# Patient Record
Sex: FEMALE | Race: WHITE
[De-identification: names, ages, dates, MRNs, and addresses within clinical notes are randomized per-mention and may not be internally consistent; named-entity substitution may affect disease eponyms.]

---

## 2022-06-22 ENCOUNTER — HOSPITAL ENCOUNTER (OUTPATIENT)
Dept: HOSPITAL 94 - SSTAY O | Age: 62
Discharge: HOME | End: 2022-06-22
Attending: RADIOLOGY
Payer: MEDICAID

## 2022-06-22 VITALS — SYSTOLIC BLOOD PRESSURE: 125 MMHG | DIASTOLIC BLOOD PRESSURE: 70 MMHG

## 2022-06-22 VITALS — SYSTOLIC BLOOD PRESSURE: 160 MMHG | DIASTOLIC BLOOD PRESSURE: 80 MMHG

## 2022-06-22 VITALS — SYSTOLIC BLOOD PRESSURE: 163 MMHG | DIASTOLIC BLOOD PRESSURE: 111 MMHG

## 2022-06-22 VITALS — SYSTOLIC BLOOD PRESSURE: 159 MMHG | DIASTOLIC BLOOD PRESSURE: 92 MMHG

## 2022-06-22 VITALS — WEIGHT: 131.18 LBS | BODY MASS INDEX: 19.88 KG/M2 | HEIGHT: 68 IN

## 2022-06-22 VITALS — DIASTOLIC BLOOD PRESSURE: 88 MMHG | SYSTOLIC BLOOD PRESSURE: 161 MMHG

## 2022-06-22 DIAGNOSIS — C49.0: ICD-10-CM

## 2022-06-22 DIAGNOSIS — E03.9: ICD-10-CM

## 2022-06-22 DIAGNOSIS — M79.89: Primary | ICD-10-CM

## 2022-06-22 DIAGNOSIS — Z79.899: ICD-10-CM

## 2022-06-22 DIAGNOSIS — Z98.890: ICD-10-CM

## 2022-06-22 DIAGNOSIS — Z85.21: ICD-10-CM

## 2022-06-22 LAB
BASOPHILS # BLD AUTO: 0 X10'3 (ref 0–0.2)
BASOPHILS NFR BLD AUTO: 0.6 % (ref 0–1)
EOSINOPHIL # BLD AUTO: 0.1 X10'3 (ref 0–0.9)
EOSINOPHIL NFR BLD AUTO: 1.6 % (ref 0–6)
ERYTHROCYTE [DISTWIDTH] IN BLOOD BY AUTOMATED COUNT: 13.2 % (ref 11.5–14.5)
HCT VFR BLD AUTO: 35.4 % (ref 35–45)
HGB BLD-MCNC: 11.8 G/DL (ref 12–16)
LYMPHOCYTES # BLD AUTO: 1 X10'3 (ref 1.1–4.8)
LYMPHOCYTES NFR BLD AUTO: 18.3 % (ref 21–51)
MCH RBC QN AUTO: 29 PG (ref 27–31)
MCHC RBC AUTO-ENTMCNC: 33.5 G/DL (ref 33–36.5)
MCV RBC AUTO: 86.5 FL (ref 78–98)
MONOCYTES # BLD AUTO: 0.4 X10'3 (ref 0–0.9)
MONOCYTES NFR BLD AUTO: 8.1 % (ref 2–12)
NEUTROPHILS # BLD AUTO: 3.8 X10'3 (ref 1.8–7.7)
NEUTROPHILS NFR BLD AUTO: 71.4 % (ref 42–75)
PLATELET # BLD AUTO: 291 X10'3 (ref 140–440)
PMV BLD AUTO: 6.4 FL (ref 7.4–10.4)
RBC # BLD AUTO: 4.09 X10'6 (ref 4.2–5.6)
WBC # BLD AUTO: 5.3 X10'3 (ref 4.5–11)

## 2022-06-22 PROCEDURE — 36415 COLL VENOUS BLD VENIPUNCTURE: CPT

## 2022-06-22 PROCEDURE — 10005 FNA BX W/US GDN 1ST LES: CPT

## 2022-06-22 PROCEDURE — 85025 COMPLETE CBC W/AUTO DIFF WBC: CPT

## 2022-06-22 PROCEDURE — 20206 BIOPSY MUSCLE PERQ NEEDLE: CPT

## 2022-06-22 NOTE — NUR
Phoned Interventional radiology, spoke to Main MCCRAY, notified him of pt ride home situation.  
Pt planning to take cab to hotel where sig other [facility] will be.  Main will update Dr. Cobos.  
Alternatives discussed with pt and Main MCCRAY, ie, local only or local and pain medication 
only.  Pt wants to get procedure done and Main will discuss with Dr. Cobos.

## 2024-01-01 ENCOUNTER — APPOINTMENT (OUTPATIENT)
Dept: RADIOLOGY | Facility: MEDICAL CENTER | Age: 64
DRG: 871 | End: 2024-01-01
Attending: HOSPITALIST
Payer: COMMERCIAL

## 2024-01-01 ENCOUNTER — APPOINTMENT (OUTPATIENT)
Dept: RADIOLOGY | Facility: MEDICAL CENTER | Age: 64
DRG: 871 | End: 2024-01-01
Attending: INTERNAL MEDICINE
Payer: COMMERCIAL

## 2024-01-01 PROCEDURE — 71045 X-RAY EXAM CHEST 1 VIEW: CPT

## 2024-04-22 ENCOUNTER — HOSPITAL ENCOUNTER (OUTPATIENT)
Dept: RADIOLOGY | Facility: MEDICAL CENTER | Age: 64
End: 2024-04-22

## 2024-04-22 ENCOUNTER — HOSPITAL ENCOUNTER (INPATIENT)
Facility: MEDICAL CENTER | Age: 64
LOS: 2 days | DRG: 177 | End: 2024-04-24
Attending: INTERNAL MEDICINE | Admitting: HOSPITALIST
Payer: COMMERCIAL

## 2024-04-22 DIAGNOSIS — J96.01 ACUTE HYPOXIC RESPIRATORY FAILURE (HCC): ICD-10-CM

## 2024-04-22 DIAGNOSIS — J18.9 POST-OBSTRUCTIVE PNEUMONIA DUE TO FOREIGN BODY ASPIRATION: ICD-10-CM

## 2024-04-22 DIAGNOSIS — C32.9 LARYNGEAL CANCER (HCC): ICD-10-CM

## 2024-04-22 DIAGNOSIS — Z78.9 FULL CODE STATUS: ICD-10-CM

## 2024-04-22 DIAGNOSIS — R00.0 REGULAR SINUS TACHYCARDIA: ICD-10-CM

## 2024-04-22 DIAGNOSIS — D64.9 ACUTE ON CHRONIC ANEMIA: ICD-10-CM

## 2024-04-22 DIAGNOSIS — Z87.891 HISTORY OF TOBACCO USE: ICD-10-CM

## 2024-04-22 DIAGNOSIS — J69.0 ASPIRATION PNEUMONIA OF RIGHT LOWER LOBE DUE TO REGURGITATED FOOD (HCC): ICD-10-CM

## 2024-04-22 DIAGNOSIS — T17.908S POST-OBSTRUCTIVE PNEUMONIA DUE TO FOREIGN BODY ASPIRATION: ICD-10-CM

## 2024-04-22 DIAGNOSIS — D69.6 THROMBOCYTOPENIA (HCC): ICD-10-CM

## 2024-04-22 LAB
ERYTHROCYTE [DISTWIDTH] IN BLOOD BY AUTOMATED COUNT: 63.9 FL (ref 35.9–50)
HCT VFR BLD AUTO: 22 % (ref 37–47)
HGB BLD-MCNC: 7 G/DL (ref 12–16)
LACTATE SERPL-SCNC: 0.9 MMOL/L (ref 0.5–2)
MCH RBC QN AUTO: 30.2 PG (ref 27–33)
MCHC RBC AUTO-ENTMCNC: 31.8 G/DL (ref 32.2–35.5)
MCV RBC AUTO: 94.8 FL (ref 81.4–97.8)
PLATELET # BLD AUTO: 104 K/UL (ref 164–446)
PMV BLD AUTO: 10.2 FL (ref 9–12.9)
RBC # BLD AUTO: 2.32 M/UL (ref 4.2–5.4)
WBC # BLD AUTO: 5.1 K/UL (ref 4.8–10.8)

## 2024-04-22 PROCEDURE — 700102 HCHG RX REV CODE 250 W/ 637 OVERRIDE(OP): Performed by: HOSPITALIST

## 2024-04-22 PROCEDURE — 36415 COLL VENOUS BLD VENIPUNCTURE: CPT

## 2024-04-22 PROCEDURE — A9270 NON-COVERED ITEM OR SERVICE: HCPCS | Performed by: HOSPITALIST

## 2024-04-22 PROCEDURE — 99223 1ST HOSP IP/OBS HIGH 75: CPT | Performed by: HOSPITALIST

## 2024-04-22 PROCEDURE — 94760 N-INVAS EAR/PLS OXIMETRY 1: CPT

## 2024-04-22 PROCEDURE — 83605 ASSAY OF LACTIC ACID: CPT

## 2024-04-22 PROCEDURE — 85027 COMPLETE CBC AUTOMATED: CPT

## 2024-04-22 PROCEDURE — 770001 HCHG ROOM/CARE - MED/SURG/GYN PRIV*

## 2024-04-22 PROCEDURE — 700111 HCHG RX REV CODE 636 W/ 250 OVERRIDE (IP): Mod: JZ | Performed by: HOSPITALIST

## 2024-04-22 PROCEDURE — 87040 BLOOD CULTURE FOR BACTERIA: CPT

## 2024-04-22 PROCEDURE — 84145 PROCALCITONIN (PCT): CPT

## 2024-04-22 PROCEDURE — 700105 HCHG RX REV CODE 258: Performed by: HOSPITALIST

## 2024-04-22 RX ORDER — LEVOTHYROXINE SODIUM 0.05 MG/1
50 TABLET ORAL
Status: DISCONTINUED | OUTPATIENT
Start: 2024-04-22 | End: 2024-04-24 | Stop reason: HOSPADM

## 2024-04-22 RX ORDER — ACETAMINOPHEN 325 MG/1
650 TABLET ORAL EVERY 6 HOURS PRN
Status: DISCONTINUED | OUTPATIENT
Start: 2024-04-22 | End: 2024-04-24 | Stop reason: HOSPADM

## 2024-04-22 RX ORDER — ONDANSETRON 4 MG/1
4 TABLET, ORALLY DISINTEGRATING ORAL EVERY 4 HOURS PRN
Status: DISCONTINUED | OUTPATIENT
Start: 2024-04-22 | End: 2024-04-24 | Stop reason: HOSPADM

## 2024-04-22 RX ORDER — DIAZEPAM 5 MG/1
5 TABLET ORAL 2 TIMES DAILY PRN
Status: DISCONTINUED | OUTPATIENT
Start: 2024-04-22 | End: 2024-04-24 | Stop reason: HOSPADM

## 2024-04-22 RX ORDER — ONDANSETRON 2 MG/ML
4 INJECTION INTRAMUSCULAR; INTRAVENOUS EVERY 4 HOURS PRN
Status: DISCONTINUED | OUTPATIENT
Start: 2024-04-22 | End: 2024-04-24 | Stop reason: HOSPADM

## 2024-04-22 RX ORDER — DIAZEPAM 5 MG/1
5 TABLET ORAL 2 TIMES DAILY
COMMUNITY

## 2024-04-22 RX ORDER — HYDROCODONE BITARTRATE AND ACETAMINOPHEN 10; 325 MG/1; MG/1
1-2 TABLET ORAL EVERY 8 HOURS PRN
Status: DISCONTINUED | OUTPATIENT
Start: 2024-04-22 | End: 2024-04-24 | Stop reason: HOSPADM

## 2024-04-22 RX ORDER — AMOXICILLIN 250 MG
2 CAPSULE ORAL EVERY EVENING
Status: DISCONTINUED | OUTPATIENT
Start: 2024-04-22 | End: 2024-04-24 | Stop reason: HOSPADM

## 2024-04-22 RX ORDER — HYDROCODONE BITARTRATE AND ACETAMINOPHEN 10; 325 MG/1; MG/1
1-2 TABLET ORAL 2 TIMES DAILY
COMMUNITY

## 2024-04-22 RX ORDER — GABAPENTIN 300 MG/1
300 CAPSULE ORAL 3 TIMES DAILY
COMMUNITY

## 2024-04-22 RX ORDER — LABETALOL HYDROCHLORIDE 5 MG/ML
10 INJECTION, SOLUTION INTRAVENOUS EVERY 4 HOURS PRN
Status: DISCONTINUED | OUTPATIENT
Start: 2024-04-22 | End: 2024-04-24 | Stop reason: HOSPADM

## 2024-04-22 RX ORDER — PROMETHAZINE HYDROCHLORIDE 25 MG/1
12.5-25 SUPPOSITORY RECTAL EVERY 4 HOURS PRN
Status: DISCONTINUED | OUTPATIENT
Start: 2024-04-22 | End: 2024-04-24 | Stop reason: HOSPADM

## 2024-04-22 RX ORDER — LEVOTHYROXINE SODIUM 0.05 MG/1
50 TABLET ORAL
COMMUNITY

## 2024-04-22 RX ORDER — GUAIFENESIN/DEXTROMETHORPHAN 100-10MG/5
10 SYRUP ORAL EVERY 6 HOURS PRN
Status: DISCONTINUED | OUTPATIENT
Start: 2024-04-22 | End: 2024-04-24 | Stop reason: HOSPADM

## 2024-04-22 RX ORDER — PROCHLORPERAZINE EDISYLATE 5 MG/ML
5-10 INJECTION INTRAMUSCULAR; INTRAVENOUS EVERY 4 HOURS PRN
Status: DISCONTINUED | OUTPATIENT
Start: 2024-04-22 | End: 2024-04-24 | Stop reason: HOSPADM

## 2024-04-22 RX ORDER — POLYETHYLENE GLYCOL 3350 17 G/17G
1 POWDER, FOR SOLUTION ORAL
Status: DISCONTINUED | OUTPATIENT
Start: 2024-04-22 | End: 2024-04-24 | Stop reason: HOSPADM

## 2024-04-22 RX ORDER — PROMETHAZINE HYDROCHLORIDE 25 MG/1
12.5-25 TABLET ORAL EVERY 4 HOURS PRN
Status: DISCONTINUED | OUTPATIENT
Start: 2024-04-22 | End: 2024-04-24 | Stop reason: HOSPADM

## 2024-04-22 RX ORDER — GABAPENTIN 300 MG/1
300 CAPSULE ORAL 3 TIMES DAILY
Status: DISCONTINUED | OUTPATIENT
Start: 2024-04-22 | End: 2024-04-24 | Stop reason: HOSPADM

## 2024-04-22 RX ORDER — MORPHINE SULFATE 4 MG/ML
2 INJECTION INTRAVENOUS
Status: DISCONTINUED | OUTPATIENT
Start: 2024-04-22 | End: 2024-04-24 | Stop reason: HOSPADM

## 2024-04-22 RX ADMIN — DIAZEPAM 5 MG: 5 TABLET ORAL at 18:10

## 2024-04-22 RX ADMIN — PIPERACILLIN SODIUM AND TAZOBACTAM SODIUM 3.38 G: 3; .375 INJECTION, POWDER, LYOPHILIZED, FOR SOLUTION INTRAVENOUS at 18:14

## 2024-04-22 RX ADMIN — GABAPENTIN 300 MG: 300 CAPSULE ORAL at 16:40

## 2024-04-22 RX ADMIN — HYDROCODONE BITARTRATE AND ACETAMINOPHEN 1 TABLET: 10; 325 TABLET ORAL at 16:40

## 2024-04-22 RX ADMIN — GUAIFENESIN SYRUP AND DEXTROMETHORPHAN 10 ML: 100; 10 SYRUP ORAL at 16:40

## 2024-04-22 RX ADMIN — DEXTROSE AND SODIUM CHLORIDE: 5; 450 INJECTION, SOLUTION INTRAVENOUS at 18:13

## 2024-04-22 RX ADMIN — PIPERACILLIN SODIUM AND TAZOBACTAM SODIUM 3.38 G: 3; .375 INJECTION, POWDER, LYOPHILIZED, FOR SOLUTION INTRAVENOUS at 21:02

## 2024-04-22 ASSESSMENT — LIFESTYLE VARIABLES
ON A TYPICAL DAY WHEN YOU DRINK ALCOHOL HOW MANY DRINKS DO YOU HAVE: 0
AVERAGE NUMBER OF DAYS PER WEEK YOU HAVE A DRINK CONTAINING ALCOHOL: 0
TOTAL SCORE: 0
CONSUMPTION TOTAL: NEGATIVE
EVER HAD A DRINK FIRST THING IN THE MORNING TO STEADY YOUR NERVES TO GET RID OF A HANGOVER: NO
DOES PATIENT WANT TO STOP DRINKING: NO
HAVE YOU EVER FELT YOU SHOULD CUT DOWN ON YOUR DRINKING: NO
TOTAL SCORE: 0
ALCOHOL_USE: NO
TOTAL SCORE: 0
EVER FELT BAD OR GUILTY ABOUT YOUR DRINKING: NO
HAVE PEOPLE ANNOYED YOU BY CRITICIZING YOUR DRINKING: NO
HOW MANY TIMES IN THE PAST YEAR HAVE YOU HAD 5 OR MORE DRINKS IN A DAY: 0

## 2024-04-22 ASSESSMENT — ENCOUNTER SYMPTOMS
NERVOUS/ANXIOUS: 0
BRUISES/BLEEDS EASILY: 0
LOSS OF CONSCIOUSNESS: 0
BLOOD IN STOOL: 0
HEADACHES: 1
CONSTIPATION: 0
PSYCHIATRIC NEGATIVE: 1
NAUSEA: 0
VOMITING: 0
SHORTNESS OF BREATH: 1
DOUBLE VISION: 0
WHEEZING: 0
HEARTBURN: 0
MYALGIAS: 1
PALPITATIONS: 0
FOCAL WEAKNESS: 0
COUGH: 1
CHILLS: 0
EYES NEGATIVE: 1
DIAPHORESIS: 0
GASTROINTESTINAL NEGATIVE: 1
DIARRHEA: 0
HEMOPTYSIS: 0
ABDOMINAL PAIN: 0
FEVER: 1
SEIZURES: 0
DIZZINESS: 0
CARDIOVASCULAR NEGATIVE: 1

## 2024-04-22 ASSESSMENT — COGNITIVE AND FUNCTIONAL STATUS - GENERAL
SUGGESTED CMS G CODE MODIFIER DAILY ACTIVITY: CJ
TOILETING: A LITTLE
MOBILITY SCORE: 22
DAILY ACTIVITIY SCORE: 22
SUGGESTED CMS G CODE MODIFIER MOBILITY: CJ
CLIMB 3 TO 5 STEPS WITH RAILING: A LITTLE
WALKING IN HOSPITAL ROOM: A LITTLE
HELP NEEDED FOR BATHING: A LITTLE

## 2024-04-22 ASSESSMENT — PATIENT HEALTH QUESTIONNAIRE - PHQ9
1. LITTLE INTEREST OR PLEASURE IN DOING THINGS: SEVERAL DAYS
3. TROUBLE FALLING OR STAYING ASLEEP OR SLEEPING TOO MUCH: SEVERAL DAYS
7. TROUBLE CONCENTRATING ON THINGS, SUCH AS READING THE NEWSPAPER OR WATCHING TELEVISION: SEVERAL DAYS
8. MOVING OR SPEAKING SO SLOWLY THAT OTHER PEOPLE COULD HAVE NOTICED. OR THE OPPOSITE, BEING SO FIGETY OR RESTLESS THAT YOU HAVE BEEN MOVING AROUND A LOT MORE THAN USUAL: NOT AT ALL
5. POOR APPETITE OR OVEREATING: SEVERAL DAYS
2. FEELING DOWN, DEPRESSED, IRRITABLE, OR HOPELESS: SEVERAL DAYS
SUM OF ALL RESPONSES TO PHQ9 QUESTIONS 1 AND 2: 2
SUM OF ALL RESPONSES TO PHQ QUESTIONS 1-9: 6
6. FEELING BAD ABOUT YOURSELF - OR THAT YOU ARE A FAILURE OR HAVE LET YOURSELF OR YOUR FAMILY DOWN: NOT AL ALL
4. FEELING TIRED OR HAVING LITTLE ENERGY: SEVERAL DAYS
9. THOUGHTS THAT YOU WOULD BE BETTER OFF DEAD, OR OF HURTING YOURSELF: NOT AT ALL

## 2024-04-22 ASSESSMENT — COPD QUESTIONNAIRES
DO YOU EVER COUGH UP ANY MUCUS OR PHLEGM?: YES, A FEW DAYS A WEEK OR MONTH
COPD SCREENING SCORE: 7
DURING THE PAST 4 WEEKS HOW MUCH DID YOU FEEL SHORT OF BREATH: SOME OF THE TIME
HAVE YOU SMOKED AT LEAST 100 CIGARETTES IN YOUR ENTIRE LIFE: YES

## 2024-04-22 ASSESSMENT — PAIN DESCRIPTION - PAIN TYPE: TYPE: ACUTE PAIN;CHRONIC PAIN

## 2024-04-22 ASSESSMENT — VISUAL ACUITY: OU: 1

## 2024-04-22 NOTE — PROGRESS NOTES
[facility] DIRECT ADMISSION REPORT  Transferring facility: [place]  Transferring physician: Dr. José Miguel Wolfe    Chief complaint: Shortness of breath  Pertinent history & patient course:   History of laryngeal cancer which is metastatic, currently on chemotherapy in [place] who presented to an outside hospital complaining of shortness of breath.  He was found to have hypoxia at 85% on room air and was placed on 2 L.  CT scan showed what appeared to be a postobstructive pneumonia and possible endobronchial metastases    Pertinent imaging & lab results:   CT scan showing postobstructive pneumonia    Consultants called prior to transfer and pertinent input from consultants: None    Code Status: Unknown per transferring provider, I personally verified with the transferring provider patient's code status and the transferring provider has confirmed this with the patient.    Reason for Transfer: Bronchoscopy    Further work up or recommendations requested prior to transfer: N/a    Patient accepted for transfer: Yes  Accepting [facility] Facility: [facility] - Nursing to notify the admitting provider when patient arrives to the unit.    Consultants to be called upon arrival: None  Admission status: Inpatient.   Floor requested: med/surg      The admitting provider is the point of contact for questions or concerns regarding patient's care.

## 2024-04-22 NOTE — CARE PLAN
The patient is Watcher - Medium risk of patient condition declining or worsening         Progress made toward(s) clinical / shift goals:  new admit in progress     Patient is not progressing towards the following goals:

## 2024-04-23 PROBLEM — J69.0 ASPIRATION PNEUMONIA OF RIGHT LOWER LOBE (HCC): Status: ACTIVE | Noted: 2024-04-23

## 2024-04-23 LAB
ANION GAP SERPL CALC-SCNC: 10 MMOL/L (ref 7–16)
BUN SERPL-MCNC: 14 MG/DL (ref 8–22)
CALCIUM SERPL-MCNC: 8.1 MG/DL (ref 8.4–10.2)
CHLORIDE SERPL-SCNC: 95 MMOL/L (ref 96–112)
CO2 SERPL-SCNC: 29 MMOL/L (ref 20–33)
CREAT SERPL-MCNC: 0.61 MG/DL (ref 0.5–1.4)
ERYTHROCYTE [DISTWIDTH] IN BLOOD BY AUTOMATED COUNT: 65.9 FL (ref 35.9–50)
GFR SERPLBLD CREATININE-BSD FMLA CKD-EPI: 100 ML/MIN/1.73 M 2
GLUCOSE SERPL-MCNC: 107 MG/DL (ref 65–99)
GRAM STN SPEC: NORMAL
HCT VFR BLD AUTO: 22.2 % (ref 37–47)
HGB BLD-MCNC: 7 G/DL (ref 12–16)
MCH RBC QN AUTO: 30.3 PG (ref 27–33)
MCHC RBC AUTO-ENTMCNC: 31.5 G/DL (ref 32.2–35.5)
MCV RBC AUTO: 96.1 FL (ref 81.4–97.8)
PLATELET # BLD AUTO: 101 K/UL (ref 164–446)
PMV BLD AUTO: 9.7 FL (ref 9–12.9)
POTASSIUM SERPL-SCNC: 3.5 MMOL/L (ref 3.6–5.5)
PROCALCITONIN SERPL-MCNC: 3.64 NG/ML
RBC # BLD AUTO: 2.31 M/UL (ref 4.2–5.4)
SIGNIFICANT IND 70042: NORMAL
SITE SITE: NORMAL
SODIUM SERPL-SCNC: 134 MMOL/L (ref 135–145)
SOURCE SOURCE: NORMAL
WBC # BLD AUTO: 3.9 K/UL (ref 4.8–10.8)

## 2024-04-23 PROCEDURE — 87070 CULTURE OTHR SPECIMN AEROBIC: CPT

## 2024-04-23 PROCEDURE — 36415 COLL VENOUS BLD VENIPUNCTURE: CPT

## 2024-04-23 PROCEDURE — 700102 HCHG RX REV CODE 250 W/ 637 OVERRIDE(OP): Performed by: HOSPITALIST

## 2024-04-23 PROCEDURE — 700111 HCHG RX REV CODE 636 W/ 250 OVERRIDE (IP): Mod: JZ | Performed by: HOSPITALIST

## 2024-04-23 PROCEDURE — 85027 COMPLETE CBC AUTOMATED: CPT

## 2024-04-23 PROCEDURE — 99254 IP/OBS CNSLTJ NEW/EST MOD 60: CPT | Performed by: INTERNAL MEDICINE

## 2024-04-23 PROCEDURE — 87186 SC STD MICRODIL/AGAR DIL: CPT

## 2024-04-23 PROCEDURE — 87077 CULTURE AEROBIC IDENTIFY: CPT

## 2024-04-23 PROCEDURE — A9270 NON-COVERED ITEM OR SERVICE: HCPCS | Performed by: HOSPITALIST

## 2024-04-23 PROCEDURE — 97161 PT EVAL LOW COMPLEX 20 MIN: CPT

## 2024-04-23 PROCEDURE — 770001 HCHG ROOM/CARE - MED/SURG/GYN PRIV*

## 2024-04-23 PROCEDURE — 700105 HCHG RX REV CODE 258: Performed by: HOSPITALIST

## 2024-04-23 PROCEDURE — 94760 N-INVAS EAR/PLS OXIMETRY 1: CPT

## 2024-04-23 PROCEDURE — 99233 SBSQ HOSP IP/OBS HIGH 50: CPT | Performed by: INTERNAL MEDICINE

## 2024-04-23 PROCEDURE — 307059 PAD,EAR PROTECTOR: Performed by: INTERNAL MEDICINE

## 2024-04-23 PROCEDURE — 80048 BASIC METABOLIC PNL TOTAL CA: CPT

## 2024-04-23 PROCEDURE — 87205 SMEAR GRAM STAIN: CPT

## 2024-04-23 RX ORDER — ACETAMINOPHEN 500 MG
500 TABLET ORAL EVERY 6 HOURS PRN
COMMUNITY

## 2024-04-23 RX ADMIN — GABAPENTIN 300 MG: 300 CAPSULE ORAL at 13:59

## 2024-04-23 RX ADMIN — DEXTROSE AND SODIUM CHLORIDE: 5; 450 INJECTION, SOLUTION INTRAVENOUS at 17:29

## 2024-04-23 RX ADMIN — PIPERACILLIN SODIUM AND TAZOBACTAM SODIUM 3.38 G: 3; .375 INJECTION, POWDER, LYOPHILIZED, FOR SOLUTION INTRAVENOUS at 04:42

## 2024-04-23 RX ADMIN — HYDROCODONE BITARTRATE AND ACETAMINOPHEN 1 TABLET: 10; 325 TABLET ORAL at 13:56

## 2024-04-23 RX ADMIN — LIDOCAINE HYDROCHLORIDE 15 ML: 20 SOLUTION ORAL; TOPICAL at 14:05

## 2024-04-23 RX ADMIN — PIPERACILLIN SODIUM AND TAZOBACTAM SODIUM 3.38 G: 3; .375 INJECTION, POWDER, LYOPHILIZED, FOR SOLUTION INTRAVENOUS at 14:32

## 2024-04-23 RX ADMIN — GABAPENTIN 300 MG: 300 CAPSULE ORAL at 20:28

## 2024-04-23 RX ADMIN — GUAIFENESIN SYRUP AND DEXTROMETHORPHAN 10 ML: 100; 10 SYRUP ORAL at 13:59

## 2024-04-23 RX ADMIN — DIAZEPAM 5 MG: 5 TABLET ORAL at 22:35

## 2024-04-23 RX ADMIN — PIPERACILLIN SODIUM AND TAZOBACTAM SODIUM 3.38 G: 3; .375 INJECTION, POWDER, LYOPHILIZED, FOR SOLUTION INTRAVENOUS at 20:38

## 2024-04-23 RX ADMIN — DIAZEPAM 5 MG: 5 TABLET ORAL at 14:30

## 2024-04-23 ASSESSMENT — ENCOUNTER SYMPTOMS
NEUROLOGICAL NEGATIVE: 1
PSYCHIATRIC NEGATIVE: 1
EYES NEGATIVE: 1
CARDIOVASCULAR NEGATIVE: 1
MYALGIAS: 1
SHORTNESS OF BREATH: 1
COUGH: 1

## 2024-04-23 ASSESSMENT — GAIT ASSESSMENTS
DEVIATION: BRADYKINETIC;DECREASED TOE OFF;DECREASED HEEL STRIKE;DECREASED BASE OF SUPPORT
GAIT LEVEL OF ASSIST: CONTACT GUARD ASSIST
DISTANCE (FEET): 75

## 2024-04-23 ASSESSMENT — PAIN DESCRIPTION - PAIN TYPE
TYPE: CHRONIC PAIN
TYPE: CHRONIC PAIN
TYPE: ACUTE PAIN;CHRONIC PAIN
TYPE: ACUTE PAIN
TYPE: CHRONIC PAIN;ACUTE PAIN

## 2024-04-23 ASSESSMENT — COGNITIVE AND FUNCTIONAL STATUS - GENERAL
WALKING IN HOSPITAL ROOM: A LITTLE
MOBILITY SCORE: 18
MOVING FROM LYING ON BACK TO SITTING ON SIDE OF FLAT BED: A LITTLE
SUGGESTED CMS G CODE MODIFIER MOBILITY: CK
TURNING FROM BACK TO SIDE WHILE IN FLAT BAD: A LITTLE
CLIMB 3 TO 5 STEPS WITH RAILING: A LITTLE
STANDING UP FROM CHAIR USING ARMS: A LITTLE
MOVING TO AND FROM BED TO CHAIR: A LITTLE

## 2024-04-23 NOTE — PROGRESS NOTES
[facility] Medicine Daily Progress Note    Date of Service  4/23/2024    Chief Complaint  Meme Hurd is a 63 y.o. female admitted from [place] on 4/22/2024 with hypoxemic respiratory failure. She has a history of laryngeal cancer and has been undergoing chemotherapy.    Hospital Course  Procalcitonin was 3.64.  Pulmonology were consulted, and bronchoscopy was planned. Zosyn was started for presumed aspiration pneumonia.    Interval Problem Update  Patient had an episode of bronchospasm today, symptoms improved with valium.  On 3 L/min nasal cannula oxygen.  Home oxygen ordered.  Discussed with pulmonology, recommendations are to postpone bronchoscopy today, reimage in 8 to 12 weeks.    I have discussed this patient's plan of care and discharge plan at IDT rounds today with Case Management, Nursing, Nursing leadership, and other members of the IDT team.    Consultants/Specialty  pulmonary    Code Status  Full Code    Disposition  The patient is not medically cleared for discharge to home or a post-acute facility.  Anticipate discharge to: home with close outpatient follow-up    I have placed the appropriate orders for post-discharge needs.    Review of Systems  Review of Systems   Constitutional:  Positive for malaise/fatigue.   HENT: Negative.     Eyes: Negative.    Respiratory:  Positive for shortness of breath.    Cardiovascular: Negative.    Genitourinary: Negative.    Musculoskeletal:  Positive for myalgias.   Skin: Negative.    Neurological: Negative.    Endo/Heme/Allergies: Negative.    Psychiatric/Behavioral: Negative.          Physical Exam  Temp:  [36.3 °C (97.4 °F)-37.1 °C (98.7 °F)] 36.8 °C (98.3 °F)  Pulse:  [] 106  Resp:  [16-20] 20  BP: ()/(45-61) 87/45  SpO2:  [95 %-100 %] 99 %    Physical Exam  Constitutional:       Appearance: She is ill-appearing.   HENT:      Head: Normocephalic.      Nose: Nose normal.      Mouth/Throat:      Mouth: Mucous membranes are moist.   Eyes:      Pupils:  Pupils are equal, round, and reactive to light.   Cardiovascular:      Rate and Rhythm: Tachycardia present.   Pulmonary:      Comments: Course breath sounds  Abdominal:      Palpations: Abdomen is soft.   Musculoskeletal:      Cervical back: Normal range of motion.      Right lower leg: No edema.      Left lower leg: No edema.   Skin:     General: Skin is warm.   Neurological:      General: No focal deficit present.      Mental Status: She is alert.   Psychiatric:         Mood and Affect: Mood normal.         Fluids    Intake/Output Summary (Last 24 hours) at 4/23/2024 1532  Last data filed at 4/23/2024 0032  Gross per 24 hour   Intake --   Output 50 ml   Net -50 ml       Laboratory  Recent Labs     04/22/24  1751 04/23/24  0344   WBC 5.1 3.9*   RBC 2.32* 2.31*   HEMOGLOBIN 7.0* 7.0*   HEMATOCRIT 22.0* 22.2*   MCV 94.8 96.1   MCH 30.2 30.3   MCHC 31.8* 31.5*   RDW 63.9* 65.9*   PLATELETCT 104* 101*   MPV 10.2 9.7     Recent Labs     04/23/24  0344   SODIUM 134*   POTASSIUM 3.5*   CHLORIDE 95*   CO2 29   GLUCOSE 107*   BUN 14   CREATININE 0.61   CALCIUM 8.1*                   Imaging  No orders to display        Assessment/Plan  * Post-obstructive pneumonia due to foreign body aspiration- (present on admission)  Assessment & Plan  Concern for aspiration with underlying metastatic disease.  Procalcitonin was 3.64 pulmonology were consulted.  Currently on Zosyn.  Discussed with pulmonology today.  Per pulmonology, bronchoscopy has been postponed and recommendations are to reimage in 8 to 12 weeks, then determine if bronchoscopy is needed.  Meanwhile we will continue antibiotic course.  Home oxygen has been ordered    Acute on chronic anemia- (present on admission)  Assessment & Plan  Secondary to ongoing chemotherapy.  Transfuse to keep hemoglobin above 7.0    Regular sinus tachycardia- (present on admission)  Assessment & Plan  Patient has been noted to be sinus tachycardia since she reported to the emergency room  yesterday.  She was as high as 130.  This may be secondary to early SIRS versus sepsis.  Patient was given fluid resuscitation and has improved currently she is in the 100-110 range.    Acute hypoxic respiratory failure (HCC)- (present on admission)  Assessment & Plan  Secondary to pneumonia.  On Zosyn.  Currently using 3 L/min nasal cannula oxygen.  Home oxygen ordered.  Planned for repeat imaging in 8 to 12 weeks, then decide if bronchoscopy is needed.    Thrombocytopenia (HCC)- (present on admission)  Assessment & Plan  Secondary to chemotherapy.  Monitor and transfuse as needed    History of tobacco use- (present on admission)  Assessment & Plan  Extensive history of tobacco use, patient quit tobacco use in 2016     Laryngeal cancer (HCC)- (present on admission)  Assessment & Plan  History of laryngeal cancer and chronic tobacco use.  S/p radiation and chemotherapy.  Continue outpatient follow-up with oncology    Full code status- (present on admission)  Assessment & Plan  Patient wishes to be full code         VTE prophylaxis: SCDs

## 2024-04-23 NOTE — CARE PLAN
The patient is Stable - Low risk of patient condition declining or worsening    Shift Goals  Clinical Goals: monitor labs, pain, fall precautions, increase activity with comfort, Respiratory status and O2 requirements   Patient Goals: rest and comfort  Family Goals: family on bedside    Progress made toward(s) clinical / shift goals:  in progress     Patient is not progressing towards the following goals:

## 2024-04-23 NOTE — PROGRESS NOTES
4 Eyes Skin Assessment Completed by Leslie RN and YRN Garnica.    Head WDL  Ears WDL  Nose WDL  Mouth Ulcer  Neck WDL  Breast/Chest WDL  Shoulder Blades WDL  Spine WDL  (R) Arm/Elbow/Hand WDL  (L) Arm/Elbow/Hand WDL  Abdomen WDL  Groin WDL  Scrotum/Coccyx/Buttocks  Redness Blanching  (R) Leg Scab  (L) Leg Scab  (R) Heel/Foot/Toe WDL  (L) Heel/Foot/Toe WDL     Devices In Places Pulse Ox        Interventions In Place Sacral Mepilex, NC W/Ear Foams and Pillows     Possible Skin Injury Yes     Pictures Uploaded Into Epic N/A  Wound Consult Placed Yes  RN Wound Prevention Protocol Ordered Yes

## 2024-04-23 NOTE — CARE PLAN
The patient is Stable - Low risk of patient condition declining or worsening    Shift Goals  Clinical Goals: pt will tolerate NPO, pt pain on tolerable level 3/10  Patient Goals: rest and comfort  Family Goals: family on bedside    Progress made toward(s) clinical / shift goals:  pt denies pain/discomfort at this time, no complaints of n/v nor abdominal pain, swabbed her mouth and applied moisturizer for dryness.    Patient is not progressing towards the following goals:

## 2024-04-23 NOTE — ASSESSMENT & PLAN NOTE
Patient has been noted to be sinus tachycardia since she reported to the emergency room yesterday.  She was as high as 130.  This may be secondary to early SIRS versus sepsis.  Patient was given fluid resuscitation and has improved currently she is in the 100-110 range.

## 2024-04-23 NOTE — H&P
Hospital Medicine History & Physical Note    Date of Service  4/22/2024    Primary Care Physician  No primary care provider on file.    Consultants  pulmonary    Specialist Names: Dr. Forrest    Code Status  Full Code    Chief Complaint  No chief complaint on file.      History of Presenting Illness  Meme Hurd is a 63 y.o. female who presented 4/22/2024 on transfer from [facility] due to hypoxic respiratory failure.  Patient reported to the emergency room yesterday because of worsening shortness of breath after chemotherapy for laryngeal cancer.  The patient has had a cancer since 2017.  She was receiving chemo and radiation therapy for this.  She is still not in remission.  Imaging studies now show possible worsening infiltrates because of aspiration and metastasis..  The patient was transferred to our facility for evaluation with bronchoscopy.  I have consulted pulmonology and they are comparing to see the patient and placed her on the schedule for bronchoscopy.  Since the patient appears to have aspiration pneumonia with elevated white blood cell count and worsening respiratory failure I will place her on Zosyn after obtaining sputum and blood cultures.  Patient has diffuse rhonchi bilaterally and she is immunocompromise.  Patient is also considerably anemic and may need a blood transfusion.  We will at this point run a stat H&H      I discussed the plan of care with patient, bedside RN, and pulmonologist .    Review of Systems  Review of Systems   Constitutional:  Positive for fever and malaise/fatigue. Negative for chills and diaphoresis.   HENT: Negative.     Eyes: Negative.  Negative for double vision.   Respiratory:  Positive for cough and shortness of breath. Negative for hemoptysis and wheezing.    Cardiovascular: Negative.  Negative for chest pain, palpitations and leg swelling.   Gastrointestinal: Negative.  Negative for abdominal pain, blood in stool, constipation, diarrhea, heartburn,  nausea and vomiting.   Genitourinary: Negative.  Negative for frequency, hematuria and urgency.   Musculoskeletal:  Positive for myalgias. Negative for joint pain.   Skin: Negative.  Negative for itching and rash.   Neurological:  Positive for headaches. Negative for dizziness, focal weakness, seizures and loss of consciousness.   Endo/Heme/Allergies: Negative.  Does not bruise/bleed easily.   Psychiatric/Behavioral: Negative.  Negative for suicidal ideas. The patient is not nervous/anxious.    All other systems reviewed and are negative.      Past Medical History   has a past medical history of Cancer (HCC).    Surgical History   has no past surgical history on file.     Family History  No significant cancer history in the family, but did have heart disease in the family  Family history reviewed with patient. There is no family history that is pertinent to the chief complaint.     Social History  History of tobacco 40 years  Social alcohol biweekly  No recreational drug use  Full CODE STATUS    Allergies  No Known Allergies    Medications  Prior to Admission Medications   Prescriptions Last Dose Informant Patient Reported? Taking?   HYDROcodone/acetaminophen (NORCO)  MG Tab   Yes Yes   Sig: Take 1-2 Tablets by mouth every 8 hours as needed for Moderate Pain or Severe Pain. Indications: Pain   MBX (diphenhydrAMINE-lidocaine-Maalox) oral susp Cup   Yes Yes   Sig: Take 15 mL by mouth as needed.   diazePAM (VALIUM) 5 MG Tab   Yes Yes   Sig: Take 5 mg by mouth 2 times a day as needed for Anxiety. Indications: neck and / or throat spasms   gabapentin (NEURONTIN) 300 MG Cap   Yes Yes   Sig: Take 300 mg by mouth 3 times a day.   levothyroxine (SYNTHROID) 50 MCG Tab   Yes Yes   Sig: Take 50 mcg by mouth every morning on an empty stomach.      Facility-Administered Medications: None       Physical Exam  Temp:  [36.3 °C (97.3 °F)] 36.3 °C (97.3 °F)  Pulse:  [76] 76  Resp:  [18] 18  BP: (118)/(78) 118/78  SpO2:  [92 %-96  %] 96 %  Blood Pressure: 118/78   Temperature: 36.3 °C (97.3 °F)   Pulse: 76   Respiration: 18   Pulse Oximetry: 96 %       Physical Exam  Vitals and nursing note reviewed. Exam conducted with a chaperone present.   Constitutional:       General: She is awake.      Appearance: Normal appearance. She is well-developed, well-groomed and normal weight.   HENT:      Head: Normocephalic and atraumatic.      Jaw: There is normal jaw occlusion. No trismus.      Salivary Glands: Right salivary gland is not tender. Left salivary gland is not tender.      Right Ear: External ear normal.      Left Ear: External ear normal.      Nose: Nose normal.      Mouth/Throat:      Mouth: Mucous membranes are dry.      Pharynx: Oropharynx is clear. No oropharyngeal exudate or posterior oropharyngeal erythema.   Eyes:      General: Lids are normal. Vision grossly intact.         Right eye: No discharge.         Left eye: No discharge.      Extraocular Movements: Extraocular movements intact.      Conjunctiva/sclera: Conjunctivae normal.      Right eye: Right conjunctiva is not injected. No exudate.     Left eye: Left conjunctiva is not injected. No exudate.     Pupils: Pupils are equal, round, and reactive to light.   Neck:      Thyroid: No thyroid mass.      Vascular: No hepatojugular reflux or JVD.      Trachea: No abnormal tracheal secretions or tracheal deviation.   Cardiovascular:      Rate and Rhythm: Regular rhythm. Tachycardia present. Occasional Extrasystoles are present.     Pulses: Normal pulses.      Heart sounds: Normal heart sounds. No murmur heard.     No friction rub.   Pulmonary:      Effort: Tachypnea and accessory muscle usage present.      Breath sounds: Decreased air movement present. Examination of the right-upper field reveals decreased breath sounds and rhonchi. Examination of the left-upper field reveals decreased breath sounds and rhonchi. Examination of the right-middle field reveals decreased breath sounds and  rhonchi. Examination of the left-middle field reveals decreased breath sounds and rhonchi. Examination of the right-lower field reveals decreased breath sounds and rhonchi. Examination of the left-lower field reveals decreased breath sounds and rhonchi. Decreased breath sounds and rhonchi present.   Abdominal:      General: Abdomen is flat.      Palpations: Abdomen is soft.      Tenderness: There is no abdominal tenderness. There is no right CVA tenderness or left CVA tenderness.      Hernia: No hernia is present.   Musculoskeletal:         General: Normal range of motion.      Cervical back: Full passive range of motion without pain, normal range of motion and neck supple. No rigidity. No muscular tenderness.      Right lower leg: No edema.      Left lower leg: No edema.   Lymphadenopathy:      Head:      Right side of head: No submental adenopathy.      Left side of head: No submental adenopathy.      Cervical:      Right cervical: No superficial cervical adenopathy.     Left cervical: No superficial cervical adenopathy.      Upper Body:      Right upper body: No supraclavicular adenopathy.      Left upper body: No supraclavicular adenopathy.   Skin:     General: Skin is warm and dry.      Capillary Refill: Capillary refill takes less than 2 seconds.      Coloration: Skin is not cyanotic or pale.      Findings: No abrasion or bruising.   Neurological:      General: No focal deficit present.      Mental Status: She is alert and oriented to person, place, and time. Mental status is at baseline.      GCS: GCS eye subscore is 4. GCS verbal subscore is 5. GCS motor subscore is 6.      Cranial Nerves: No cranial nerve deficit.      Sensory: No sensory deficit.      Motor: Motor function is intact.      Deep Tendon Reflexes:      Reflex Scores:       Tricep reflexes are 2+ on the right side and 2+ on the left side.       Bicep reflexes are 2+ on the right side and 2+ on the left side.       Brachioradialis reflexes are  "2+ on the right side and 2+ on the left side.       Patellar reflexes are 2+ on the right side and 2+ on the left side.       Achilles reflexes are 2+ on the right side and 2+ on the left side.  Psychiatric:         Attention and Perception: Attention and perception normal.         Mood and Affect: Mood normal.         Speech: Speech normal.         Behavior: Behavior normal. Behavior is cooperative.         Thought Content: Thought content normal.         Cognition and Memory: Cognition and memory normal.         Judgment: Judgment normal.         Laboratory:          No results for input(s): \"ALTSGPT\", \"ASTSGOT\", \"ALKPHOSPHAT\", \"TBILIRUBIN\", \"DBILIRUBIN\", \"GAMMAGT\", \"AMYLASE\", \"LIPASE\", \"ALB\", \"PREALBUMIN\", \"GLUCOSE\" in the last 72 hours.      No results for input(s): \"NTPROBNP\" in the last 72 hours.      No results for input(s): \"TROPONINT\" in the last 72 hours.    Imaging:  No orders to display       X-Ray:  I have personally reviewed the images and compared with prior images.  EKG:  I have personally reviewed the images and compared with prior images.    Assessment/Plan:  Justification for Admission Status  I anticipate this patient will require at least two midnights for appropriate medical management, necessitating inpatient admission because the patient has acute hypoxic respiratory failure with postobstructive pneumonia and require least 48 hours of inpatient management with respiratory failure    Patient will need a Med/Surg bed on MEDICAL service .  The need is secondary to acute hypoxic respiratory failure with postobstructive pneumonia.    * Post-obstructive pneumonia due to foreign body aspiration- (present on admission)  Assessment & Plan  Per the transferring facility's CAT scan they believe the patient has possible aspiration due to possible metastasis.  They recommended bronchoscopy and thus the patient was transferred to our facility for bronchoscopy.  I have spoken with pulmonology Dr. Forrest " who will be making arrangements to achieve the bronchoscopy.  In the meantime patient will be on antibiotics with Zosyn for potential aspiration.  Fluid resuscitation  Monitor cultures from the blood in the sputum.  Obtain procalcitonin level  Obtain lactic acid level     Acute hypoxic respiratory failure (HCC)- (present on admission)  Assessment & Plan  Patient presented to the [place] emergency room on 4/21/2022 with shortness of breath and was found to be only 85% saturating on room air.  She is just past chemotherapy for laryngeal cancer with metastasis.  The patient had to be put on oxygen support.  Continue with oxygen support and titrate to keep oxygen saturations above 90%      Acute on chronic anemia- (present on admission)  Assessment & Plan  Patient has chronic anemia secondary to the ongoing chemotherapy.  She has not developed worsening anemia with hemoglobin 6.9 hematocrit 20.7.  Repeat H&H urgently here and if low will transfuse    Regular sinus tachycardia- (present on admission)  Assessment & Plan  Patient has been noted to be sinus tachycardia since she reported to the emergency room yesterday.  She was as high as 130.  This may be secondary to early SIRS versus sepsis.  Patient was given fluid resuscitation and has improved currently she is in the 100-110 range.    Thrombocytopenia (HCC)- (present on admission)  Assessment & Plan  Secondary to the chemotherapy patient has developed thrombocytopenia  Continue to monitor platelet count    Laryngeal cancer (HCC)- (present on admission)  Assessment & Plan  History of laryngeal cancer most likely secondary to chronic smoking  Patient has had the cancer since 2017.  She stopped smoking in 2016.  Since then she has had radiation and chemotherapy.  She says since her most recent hemotherapy was 2 weeks ago.  Continue outpatient follow-ups with oncology    Full code status- (present on admission)  Assessment & Plan  Patient wishes to be full  code    History of tobacco use- (present on admission)  Assessment & Plan  She smoked for about 40 years she says a pack a day quit in 2016        VTE prophylaxis: SCDs/TEDs

## 2024-04-23 NOTE — PROGRESS NOTES
Medication history reviewed with pt. Med rec is complete.  Allergies reviewed, per pt    [facility][phone][facility] every 3 weeks.  Pt receives a chemo ball for 3 days after chemo, last day for her chemo ball was on 4/19/2024      Patient has not had any outpatient antibiotics in the last 30 days.    Pt is not on any anticoagulants

## 2024-04-23 NOTE — PROGRESS NOTES
4 Eyes Skin Assessment Completed by Ivy, RN and Ileana RN.    Head WDL  Ears WDL  Nose WDL  Mouth Ulcer(s)- pt verbalizes dry throat, medication for blistering  - needs further / monitored evaluation unable to see any at this time  Neck WDL  Breast/Chest WDL  Shoulder Blades WDL  Spine WDL  (R) Arm/Elbow/Hand WDL  (L) Arm/Elbow/Hand WDL  Abdomen WDL  Groin WDL - unable to assess per pt at this time; pt verbalizes skin intact  education provided for further assessment   Scrotum/Coccyx/Buttocks WDL- unable to assess per pt at this time; pt verbalizes skin intact  - education provided for further assessment   (R) Leg Scab  (L) Leg Scab  (R) Heel/Foot/Toe WDL  (L) Heel/Foot/Toe WDL    Devices In Places Pulse Ox      Interventions In Place NC W/Ear Foams and Pillows    Possible Skin Injury No    Pictures Uploaded Into Epic N/A  Wound Consult Placed N/A  RN Wound Prevention Protocol Ordered No

## 2024-04-23 NOTE — PROGRESS NOTES
Received report from night shift RN. Assumed pt care 0700  Pt is drowsy, opens eyes to voice and  resting comfortably in bed, no distress noted. Plan of care reviewed for activities and goals this shift. Medication eduction provided.  Pt verbalizes understanding of plan of care for this shift.  Pt denies pain and/or nausea at this moment.  Patients needs attended well. Fall precautions in place. Bed at lowest position. Call light and personal belongings within reach.   Hourly rounding in progress.  Pt NPO status for possible procedure today. No updated plan of care or scheduled procedure at this time.   New orders for change in diet and plan of care updated.   Pt will require home O2 - case mngr aware - no updates on O2 delivery.   Pt and family are counting on being discharged today. Pt has been up to chair this shift for her meal.   Medication education provided.   Pt experienced a few minutes of throat spasms. O2 stable. Relaxation techniques and medications provided and spasms resolved.   Home care education regarding symptoms reviewed.   Pt now resting in bed. No distress noted after this episode. New IV established IV ABX infusing.   Spouse at bedside.   1600-Pt resting in bed eyes closed - no distress noted.

## 2024-04-23 NOTE — CONSULTS
Pulmonary Consultation    Date of Service  4/23/2024    Referring Physician  Danita Fabian M.D.    Consulting Physician  Shawn Baldwin D.O.    Reason for Consultation  Pneumonia    History of Presenting Illness  63 y.o. female who presented 4/22/2024 with pneumonia.  She initially presented to a hospital in [place].  CT imaging revealed pneumonia with right middle lobe and right lower lobe pattern.  On my interview, she endorses that she has had chronic dysphagia and chronic aspiration ever since having head and neck cancer with radiation treatments to the left side of her face.  She eats a pureed diet.  She has had several recurrent pneumonias.  She is currently getting chemotherapy.    Review of Systems  Review of Systems   Constitutional:  Positive for malaise/fatigue.   Respiratory:  Positive for cough.    All other systems reviewed and are negative.      Past Medical History   has a past medical history of Cancer (HCC).    Surgical History   has no past surgical history on file.    Family History  family history is not on file.    Social History       Medications  Prior to Admission Medications   Prescriptions Last Dose Informant Patient Reported? Taking?   BIOTIN PO 4/22/2024 at 1000 Patient Yes Yes   Sig: Take 1 Spray by mouth as needed (For dry mouth).   HYDROcodone/acetaminophen (NORCO)  MG Tab 4/22/2024 at 1400 Patient Yes Yes   Sig: Take 1-2 Tablets by mouth 2 times a day. Indications: Pain   Multiple Vitamins-Minerals (MULTIVITAMIN GUMMIES ADULTS PO) 4/22/2024 at 0900 Patient Yes Yes   Sig: Take 2 Tablets by mouth every day.   acetaminophen (TYLENOL) 500 MG Tab 4/22/2024 at 1300 Patient Yes Yes   Sig: Take 500 mg by mouth every 6 hours as needed. Indications: Pain   diazePAM (VALIUM) 5 MG Tab 4/22/2024 at 0900 Patient Yes Yes   Sig: Take 5 mg by mouth 2 times a day. Indications: neck and / or throat spasms   gabapentin (NEURONTIN) 300 MG Cap 4/22/2024 at 1300 Patient Yes Yes   Sig: Take  300 mg by mouth 3 times a day.   levothyroxine (SYNTHROID) 50 MCG Tab 4/22/2024 at 0500 Patient Yes Yes   Sig: Take 50 mcg by mouth every morning on an empty stomach.   magic mouthwash 4/22/2024 at 1200 Patient Yes Yes   Sig: Take 5 mL by mouth 3 times a day. Shake well      Facility-Administered Medications: None       Allergies  No Known Allergies    Physical Exam  Temp:  [36.3 °C (97.3 °F)-37.1 °C (98.7 °F)] 37.1 °C (98.7 °F)  Pulse:  [76-95] 95  Resp:  [16-18] 16  BP: ()/(49-78) 91/49  SpO2:  [92 %-100 %] 100 %    Physical Exam  Vitals reviewed.   Constitutional:       General: She is awake.      Comments: Frail-appearing   HENT:      Mouth/Throat:      Pharynx: Oropharynx is clear.   Eyes:      General:         Right eye: No discharge.         Left eye: No discharge.      Conjunctiva/sclera: Conjunctivae normal.      Pupils: Pupils are equal, round, and reactive to light.   Cardiovascular:      Rate and Rhythm: Normal rate.      Pulses: Normal pulses.   Pulmonary:      Effort: Pulmonary effort is normal.      Breath sounds: Normal breath sounds.   Musculoskeletal:      Right lower leg: No edema.      Left lower leg: No edema.   Skin:     General: Skin is warm.      Capillary Refill: Capillary refill takes less than 2 seconds.      Coloration: Skin is not jaundiced.   Neurological:      General: No focal deficit present.      Mental Status: She is alert.   Psychiatric:         Behavior: Behavior normal.         Fluids      Laboratory  Recent Labs     04/22/24  1751 04/23/24  0344   WBC 5.1 3.9*   RBC 2.32* 2.31*   HEMOGLOBIN 7.0* 7.0*   HEMATOCRIT 22.0* 22.2*   MCV 94.8 96.1   MCH 30.2 30.3   MCHC 31.8* 31.5*   RDW 63.9* 65.9*   PLATELETCT 104* 101*   MPV 10.2 9.7     Recent Labs     04/23/24  0344   SODIUM 134*   POTASSIUM 3.5*   CHLORIDE 95*   CO2 29   GLUCOSE 107*   BUN 14   CREATININE 0.61   CALCIUM 8.1*                     Imaging    CT with right middle lobe and right lower lobe consolidative pattern  consistent with aspiration pneumonitis/pneumonia    Assessment/Plan  Aspiration pneumonia of right lower lobe (HCC)  Assessment & Plan  Known chronic aspiration secondary to dysphagia secondary to head neck cancer with a history of radiation treatments to the left neck.  Consider a speech therapy consult for swallow evaluation.  Treat for aspiration pneumonia  Repeat CT of the chest in 8 to 12 weeks as an outpatient to evaluate for resolution.  If persistent abnormalities noted on CT, an outpatient bronchoscopy may be indicated.  I did discuss possibly doing an inpatient bronchoscopy with her.  I offered this due to the fact that she lives in [place].  She stated that she would not have difficulty coming to clinic in [place] and is okay with treating the pneumonia and doing follow up imaging with bronchoscopy as needed.       We will sign off. Please do not hesitate to contact us if we can be of any further assistance. I am most easily reached via [employer] secure messaging application.    Shawn Baldwin, DO  Staff Pulmonologist and Intensivist  [facility]     Please note that this dictation was created using voice recognition software. The accuracy of the dictation is limited to the abilities of the software. I have made every reasonable attempt to correct obvious errors, but I expect that there are errors of grammar and possibly content that I did not discover before finalizing the note.

## 2024-04-23 NOTE — ASSESSMENT & PLAN NOTE
Secondary to pneumonia.  On Zosyn.  Currently using 3 L/min nasal cannula oxygen.  Home oxygen ordered.  Planned for repeat imaging in 8 to 12 weeks, then decide if bronchoscopy is needed.

## 2024-04-23 NOTE — ASSESSMENT & PLAN NOTE
Known chronic aspiration secondary to dysphagia secondary to head neck cancer with a history of radiation treatments to the left neck.  Consider a speech therapy consult for swallow evaluation.  Treat for aspiration pneumonia  Repeat CT of the chest in 8 to 12 weeks as an outpatient to evaluate for resolution.  If persistent abnormalities noted on CT, an outpatient bronchoscopy may be indicated.  I did discuss possibly doing an inpatient bronchoscopy with her.  I offered this due to the fact that she lives in [place].  She stated that she would not have difficulty coming to clinic in [place] and is okay with treating the pneumonia and doing follow up imaging with bronchoscopy as needed.

## 2024-04-23 NOTE — PROGRESS NOTES
Received bedside report from morning RN, resumed care of pt. Pt resting comfortably in bed, resp even and unlabored no s/s of resp distress. No complaints at this time, no report of pain/discomfort. Pt on strict NPO. Safety measures in place, treaded socks on, bed alarm on. Call light within reach. Hourly rounding in place.

## 2024-04-23 NOTE — DISCHARGE PLANNING
Addendum:  4-23-24/1651  Home oxygen orders placed 1535 today.  Per Dr. Fabian, patient not leaving today because of bronchospasms.  Referral sent to [facility].      Case Management Discharge Planning    Admission Date: 4/22/2024  GMLOS: 5  ALOS: 1    6-Clicks ADL Score: 22  6-Clicks Mobility Score: 18      Anticipated Discharge Dispo: Discharge Disposition: Discharged to home/self care (01)  Discharge Address: [address] [place] [address]  [address]  Discharge Contact Phone Number: [phone]    DME Needed: home oxygen    Action(s) Taken:   RN JJ met with patient and patient's significant other, Norman at bedside.  Pt lives in a [address] [place] [address] in [place] with her significant other, Norman.  Pt's PCP is at [facility] in [place].  Pt agreeable with home oxygen.  She signed choice form for [facility].  Form faxed to [facility].  Voalte msg to Dr. Fabian for orders and face to face please.  PT recommending home health.  No home health available for patient with [facility] in [place] unfortunately.    Medically Clear: Yes    Next Steps:   Follow up with [facility].    Barriers to Discharge: DME    Care Transition Team Assessment    Information Source  Orientation Level: Oriented X4  Information Given By: Patient  Who is responsible for making decisions for patient? : Patient    Readmission Evaluation  Is this a readmission?: No    Elopement Risk  Legal Hold: No  Ambulatory or Self Mobile in Wheelchair: No-Not an Elopement Risk  Elopement Risk: Not at Risk for Elopement    Interdisciplinary Discharge Planning  Lives with - Patient's Self Care Capacity: Significant Other  Patient or legal guardian wants to designate a caregiver: No  Support Systems: Spouse / Significant Other  Housing / Facility: [address] [facility]  Prior Services: Home-Independent  Durable Medical Equipment: Not Applicable    Discharge Preparedness  What is your plan after discharge?: Home with  help  What are your discharge supports?: Partner  Prior Functional Level: Ambulatory, Drives Self, Independent with Activities of Daily Living, Independent with Medication Management  Difficulity with ADLs: None  Difficulity with IADLs: None    Functional Assesment  Prior Functional Level: Ambulatory, Drives Self, Independent with Activities of Daily Living, Independent with Medication Management    Finances  Financial Barriers to Discharge: No  Prescription Coverage: Yes    Vision / Hearing Impairment  Vision Impairment : Yes  Right Eye Vision: Wears Glasses  Left Eye Vision: Wears Glasses  Hearing Impairment : No         Advance Directive  Advance Directive?: None    Domestic Abuse  Have you ever been the victim of abuse or violence?: No  Physical Abuse or Sexual Abuse: No  Verbal Abuse or Emotional Abuse: No  Possible Abuse/Neglect Reported to:: Not Applicable    Psychological Assessment  History of Substance Abuse: None  History of Psychiatric Problems: No    Discharge Risks or Barriers  Discharge risks or barriers?: Uninsured / underinsured  Patient risk factors: Uninsured or underinsured    Anticipated Discharge Information  Discharge Disposition: Discharged to home/self care (01)  Discharge Address: [address] [place] [address]  [address]  Discharge Contact Phone Number: [phone]

## 2024-04-23 NOTE — FACE TO FACE
"Face to Face Note  -  Durable Medical Equipment    Danita Fabian M.D. - NPI: [id number]  I certify that this patient is under my care and that they had a durable medical equipment(DME)face to face encounter by myself that meets the physician DME face-to-face encounter requirements with this patient on:    Date of encounter:   Patient:                    MRN:                       YOB: 2025  Meme Hurd  [id number]  1960     The encounter with the patient was in whole, or in part, for the following medical condition, which is the primary reason for durable medical equipment:  Other - Respiratory failure due to post obstructive pneumonia    I certify that, based on my findings, the following durable medical equipment is medically necessary:    Oxygen   HOME O2 Saturation Measurements:(Values must be present for Home Oxygen orders)  Room air sat at rest: 83  Room air sat with amb: 81  With liters of O2: 3, O2 sat at rest with O2: 97  With Liters of O2: 3, O2 sat with amb with O2 : 96  Is the patient mobile?: Yes  If patient feels more short of breath, they can go up to 6 liters per minute and contact healthcare provider.    Supporting Symptoms: The patient requires supplemental oxygen, as the following interventions have been tried with limited or no improvement: \"Bronchodilators and/or steroid inhalers, \"Positive expiratory pressure therapies, and \"Oral and/or IV steroids.    My Clinical findings support the need for the above equipment due to:  Hypoxia  "

## 2024-04-23 NOTE — THERAPY
Physical Therapy   Initial Evaluation     Patient Name: Meme Hurd  Age:  63 y.o., Sex:  female  Medical Record #: [id number]  Today's Date: 4/23/2024     Precautions  Precautions: (P) Fall Risk  Comments: (P) Hx of laryngeal cancer and on chemo past 3 mo    Assessment    Patient is 63 y.o. female with a diagnosis of hypoxic respiratory failure and a hx of laryngeal cancer, chemotherapy, pneumonia and anemia. Pt presents today with balance deficits, functional strength deficits, poor gait mechanics, decreased activity tolerance, and needed 3L O2 with activity. Pt needed SBA for STS and multiple VC/CGA during ambulation in order to maintain safety due to narrow CARLOS ALBERTO. Pt also needed a seated break midway through ambulation in order to walk back to bed. Pt unable to achieve PLOF in today's session. Recommend home health transitional care for continued physical therapy services.      Plan    Physical Therapy Initial Treatment Plan   Treatment Plan : (P) Bed Mobility, Gait Training, Neuro Re-Education / Balance, Self Care / Home Evaluation, Therapeutic Activities, Stair Training, Therapeutic Exercise  Treatment Frequency: (P) 4 Times per Week  Duration: (P) Until Therapy Goals Met    DC Equipment Recommendations: (P) Front-Wheel Walker  Discharge Recommendations: (P) Recommend home health for continued physical therapy services        Objective       04/23/24 1059   Initial Contact Note    Initial Contact Note Order Received and Verified, Physical Therapy Evaluation in Progress with Full Report to Follow.   Precautions   Precautions Fall Risk   Comments Hx of laryngeal cancer and on chemo past 3 mo   Vitals   O2 Delivery Device Nasal Cannula   Vitals Comments 3LO2 during activity to maintain >90% SpO2   Pain 0 - 10 Group   Location Neck   Location Orientation Left   Therapist Pain Assessment Prior to Activity;During Activity;Post Activity;Nurse Notified;5   Prior Living Situation   Prior Services Home-Independent    Housing / Facility 1 Story House   Steps Into Home 0   Steps In Home 0   Rail None   Bathroom Set up Walk In Shower;Shower Chair   Equipment Owned Front-Wheel Walker;Other (Comments)  (Walking stick. Pt states that she has pain in neck with FWW and was educated on sizing correctly with walker in clinic.)   Lives with - Patient's Self Care Capacity Significant Other   Comments Been together with significant other for 20 years who would be able to help out if need be   Prior Level of Functional Mobility   Bed Mobility Independent   Transfer Status Independent   Ambulation Independent   Ambulation Distance Home  (For the past 3 months since she's been on chemo she's primarily at home.)   Assistive Devices Used None   Stairs Independent   History of Falls   History of Falls No   Cognition    Cognition / Consciousness WDL   Level of Consciousness Alert   Passive ROM Lower Body   Passive ROM Lower Body Not Tested   Active ROM Lower Body    Active ROM Lower Body  WDL   Strength Lower Body   Lower Body Strength  X   Comments Generalized weakness; has functional strength for ambulation   Balance Assessment   Sitting Balance (Static) Fair   Sitting Balance (Dynamic) Fair -   Standing Balance (Static) Fair   Standing Balance (Dynamic) Fair -   Weight Shift Sitting Fair   Weight Shift Standing Fair   Comments Functional balance but has narrow CARLOS ALBERTO and increased postural sway with static standing   Bed Mobility    Comments Pt found UIC   Gait Analysis   Gait Level Of Assist Contact Guard Assist   Assistive Device None   Distance (Feet) 75   # of Times Distance was Traveled 2  (Needed seated rest break at end of hallway)   Deviation Bradykinetic;Decreased Toe Off;Decreased Heel Strike;Decreased Base Of Support   # of Stairs Climbed 0   Weight Bearing Status FWB   Functional Mobility   Sit to Stand Standby Assist   Bed, Chair, Wheelchair Transfer Standby Assist   Transfer Method Stand Step   Mobility UIC, sit<>stand, ambulation    6 Clicks Assessment - How much HELP from from another person do you currently need... (If the patient hasn't done an activity recently, how much help from another person do you think he/she would need if he/she tried?)   Turning from your back to your side while in a flat bed without using bedrails? 3   Moving from lying on your back to sitting on the side of a flat bed without using bedrails? 3   Moving to and from a bed to a chair (including a wheelchair)? 3   Standing up from a chair using your arms (e.g., wheelchair, or bedside chair)? 3   Walking in hospital room? 3   Climbing 3-5 steps with a railing? 3   6 clicks Mobility Score 18   Activity Tolerance   Sitting in Chair 10min   Standing 4min   Edema / Skin Assessment   Edema / Skin  WDL   Patient / Family Goals    Patient / Family Goal #1 Home   Short Term Goals    Short Term Goal # 1 Pt will be able to perform all bed mobility with SPV in order to perform bed mobility at home by the end of 6 visits.   Short Term Goal # 2 Pt will be able to perform 150ft ambulation with FWW and SPV in order to perform household ambulation distances by the end of 6 visits.   Short Term Goal # 3 Pt will be able to perform EOB <> UIC transfer with SPV in order to improve transfers at home by the end of 6 visits.   Education Group   Education Provided Role of Physical Therapist;Gait Training   Role of Physical Therapist Patient Response Patient;Acceptance;Explanation;Demonstration;Verbal Demonstration   Gait Training Patient Response Patient;Acceptance;Explanation;Action Demonstration   Physical Therapy Initial Treatment Plan    Treatment Plan  Bed Mobility;Gait Training;Neuro Re-Education / Balance;Self Care / Home Evaluation;Therapeutic Activities;Stair Training;Therapeutic Exercise   Treatment Frequency 4 Times per Week   Duration Until Therapy Goals Met   Problem List    Problems Pain;Impaired Bed Mobility;Impaired Transfers;Impaired Ambulation;Functional Strength  Deficit;Impaired Balance;Impaired Coordination;Decreased Activity Tolerance;Safety Awareness Deficits / Cognition   Anticipated Discharge Equipment and Recommendations   DC Equipment Recommendations Front-Wheel Walker   Discharge Recommendations Recommend home health for continued physical therapy services   Interdisciplinary Plan of Care Collaboration   IDT Collaboration with  Nursing   Patient Position at End of Therapy Seated;Tray Table within Reach;Call Light within Reach;Phone within Reach;Family / Friend in Room   Collaboration Comments Aware of session and recs   Session Information   Date / Session Number  4/23-1 (1/4, 4/29)

## 2024-04-23 NOTE — ASSESSMENT & PLAN NOTE
Concern for aspiration with underlying metastatic disease.  Procalcitonin was 3.64 pulmonology were consulted.  Currently on Zosyn.  Discussed with pulmonology today.  Per pulmonology, bronchoscopy has been postponed and recommendations are to reimage in 8 to 12 weeks, then determine if bronchoscopy is needed.  Meanwhile we will continue antibiotic course.  Home oxygen has been ordered

## 2024-04-24 ENCOUNTER — APPOINTMENT (OUTPATIENT)
Dept: RADIOLOGY | Facility: MEDICAL CENTER | Age: 64
DRG: 177 | End: 2024-04-24
Attending: INTERNAL MEDICINE
Payer: COMMERCIAL

## 2024-04-24 ENCOUNTER — PHARMACY VISIT (OUTPATIENT)
Dept: PHARMACY | Facility: MEDICAL CENTER | Age: 64
End: 2024-04-24
Payer: COMMERCIAL

## 2024-04-24 VITALS
HEIGHT: 68 IN | RESPIRATION RATE: 16 BRPM | OXYGEN SATURATION: 100 % | SYSTOLIC BLOOD PRESSURE: 139 MMHG | BODY MASS INDEX: 25.33 KG/M2 | WEIGHT: 167.11 LBS | HEART RATE: 92 BPM | DIASTOLIC BLOOD PRESSURE: 87 MMHG | TEMPERATURE: 98.9 F

## 2024-04-24 LAB
ABO + RH BLD: NORMAL
ABO GROUP BLD: NORMAL
ANION GAP SERPL CALC-SCNC: 8 MMOL/L (ref 7–16)
BARCODED ABORH UBTYP: 6200
BARCODED PRD CODE UBPRD: NORMAL
BARCODED UNIT NUM UBUNT: NORMAL
BLD GP AB SCN SERPL QL: NORMAL
BUN SERPL-MCNC: 19 MG/DL (ref 8–22)
CALCIUM SERPL-MCNC: 8.1 MG/DL (ref 8.4–10.2)
CHLORIDE SERPL-SCNC: 96 MMOL/L (ref 96–112)
CO2 SERPL-SCNC: 28 MMOL/L (ref 20–33)
COMPONENT R 8504R: NORMAL
CREAT SERPL-MCNC: 0.63 MG/DL (ref 0.5–1.4)
ERYTHROCYTE [DISTWIDTH] IN BLOOD BY AUTOMATED COUNT: 65.2 FL (ref 35.9–50)
GFR SERPLBLD CREATININE-BSD FMLA CKD-EPI: 99 ML/MIN/1.73 M 2
GLUCOSE SERPL-MCNC: 93 MG/DL (ref 65–99)
HCT VFR BLD AUTO: 21.1 % (ref 37–47)
HCT VFR BLD AUTO: 25.3 % (ref 37–47)
HGB BLD-MCNC: 6.6 G/DL (ref 12–16)
HGB BLD-MCNC: 8.1 G/DL (ref 12–16)
MAGNESIUM SERPL-MCNC: 1.5 MG/DL (ref 1.5–2.5)
MCH RBC QN AUTO: 30.1 PG (ref 27–33)
MCHC RBC AUTO-ENTMCNC: 31.3 G/DL (ref 32.2–35.5)
MCV RBC AUTO: 96.3 FL (ref 81.4–97.8)
PLATELET # BLD AUTO: 100 K/UL (ref 164–446)
PLATELETS.RETICULATED NFR BLD AUTO: 3.2 % (ref 0.6–13.1)
PMV BLD AUTO: 10 FL (ref 9–12.9)
POTASSIUM SERPL-SCNC: 3.3 MMOL/L (ref 3.6–5.5)
PRODUCT TYPE UPROD: NORMAL
RBC # BLD AUTO: 2.19 M/UL (ref 4.2–5.4)
RH BLD: NORMAL
SODIUM SERPL-SCNC: 132 MMOL/L (ref 135–145)
UNIT STATUS USTAT: NORMAL
WBC # BLD AUTO: 2.4 K/UL (ref 4.8–10.8)

## 2024-04-24 PROCEDURE — 85055 RETICULATED PLATELET ASSAY: CPT

## 2024-04-24 PROCEDURE — 85027 COMPLETE CBC AUTOMATED: CPT

## 2024-04-24 PROCEDURE — P9016 RBC LEUKOCYTES REDUCED: HCPCS

## 2024-04-24 PROCEDURE — 36415 COLL VENOUS BLD VENIPUNCTURE: CPT

## 2024-04-24 PROCEDURE — 83735 ASSAY OF MAGNESIUM: CPT

## 2024-04-24 PROCEDURE — 86900 BLOOD TYPING SEROLOGIC ABO: CPT

## 2024-04-24 PROCEDURE — 700111 HCHG RX REV CODE 636 W/ 250 OVERRIDE (IP): Mod: JZ | Performed by: HOSPITALIST

## 2024-04-24 PROCEDURE — 85018 HEMOGLOBIN: CPT

## 2024-04-24 PROCEDURE — 86901 BLOOD TYPING SEROLOGIC RH(D): CPT

## 2024-04-24 PROCEDURE — 700111 HCHG RX REV CODE 636 W/ 250 OVERRIDE (IP): Mod: JZ | Performed by: INTERNAL MEDICINE

## 2024-04-24 PROCEDURE — 700105 HCHG RX REV CODE 258: Performed by: HOSPITALIST

## 2024-04-24 PROCEDURE — 86923 COMPATIBILITY TEST ELECTRIC: CPT

## 2024-04-24 PROCEDURE — 85014 HEMATOCRIT: CPT

## 2024-04-24 PROCEDURE — 99239 HOSP IP/OBS DSCHRG MGMT >30: CPT | Performed by: INTERNAL MEDICINE

## 2024-04-24 PROCEDURE — 86850 RBC ANTIBODY SCREEN: CPT

## 2024-04-24 PROCEDURE — A9270 NON-COVERED ITEM OR SERVICE: HCPCS | Performed by: HOSPITALIST

## 2024-04-24 PROCEDURE — 36430 TRANSFUSION BLD/BLD COMPNT: CPT

## 2024-04-24 PROCEDURE — 30233N1 TRANSFUSION OF NONAUTOLOGOUS RED BLOOD CELLS INTO PERIPHERAL VEIN, PERCUTANEOUS APPROACH: ICD-10-PCS | Performed by: INTERNAL MEDICINE

## 2024-04-24 PROCEDURE — RXMED WILLOW AMBULATORY MEDICATION CHARGE: Performed by: INTERNAL MEDICINE

## 2024-04-24 PROCEDURE — 80048 BASIC METABOLIC PNL TOTAL CA: CPT

## 2024-04-24 PROCEDURE — 700102 HCHG RX REV CODE 250 W/ 637 OVERRIDE(OP): Performed by: HOSPITALIST

## 2024-04-24 RX ORDER — AMOXICILLIN AND CLAVULANATE POTASSIUM 875; 125 MG/1; MG/1
1 TABLET, FILM COATED ORAL 2 TIMES DAILY
Qty: 8 TABLET | Refills: 0 | Status: ACTIVE | OUTPATIENT
Start: 2024-04-24 | End: 2024-04-28

## 2024-04-24 RX ORDER — POTASSIUM CHLORIDE 7.45 MG/ML
10 INJECTION INTRAVENOUS
Status: DISPENSED | OUTPATIENT
Start: 2024-04-24 | End: 2024-04-24

## 2024-04-24 RX ADMIN — GABAPENTIN 300 MG: 300 CAPSULE ORAL at 09:31

## 2024-04-24 RX ADMIN — PIPERACILLIN SODIUM AND TAZOBACTAM SODIUM 3.38 G: 3; .375 INJECTION, POWDER, LYOPHILIZED, FOR SOLUTION INTRAVENOUS at 05:26

## 2024-04-24 RX ADMIN — LEVOTHYROXINE SODIUM 50 MCG: 50 TABLET ORAL at 05:25

## 2024-04-24 RX ADMIN — POTASSIUM CHLORIDE 10 MEQ: 10 INJECTION, SOLUTION INTRAVENOUS at 12:26

## 2024-04-24 RX ADMIN — POTASSIUM CHLORIDE 10 MEQ: 10 INJECTION, SOLUTION INTRAVENOUS at 09:48

## 2024-04-24 RX ADMIN — POTASSIUM CHLORIDE 10 MEQ: 10 INJECTION, SOLUTION INTRAVENOUS at 11:22

## 2024-04-24 ASSESSMENT — PAIN DESCRIPTION - PAIN TYPE: TYPE: ACUTE PAIN

## 2024-04-24 NOTE — CARE PLAN
The patient is Stable - Low risk of patient condition declining or worsening    Shift Goals  Clinical Goals: Pain management, IV antibiotics, Monitor pulse ox, Free from falls  Patient Goals: Comfort, Rest  Family Goals: family on bedside    Progress made toward(s) clinical / shift goals:  Patient is awaiting supplemental oxygen for home use to discharge. She is looking forward to going home. She has made no complaint of pain or discomfort.     Patient is not progressing towards the following goals:

## 2024-04-24 NOTE — PROGRESS NOTES
0700 - Bedside report received from YRN Schaffer. Alert and oriented X4, on RA in no acute respiratory distress. Daily plan of care discussed. Patient complains of no pain at this time. No other needs at this time. Call light and personal belongings within reach. Hourly rounding in place. Chart reviewed for recent labs, notes, and orders.    0919 - hospitalist notified of Hgb at 6.6.     1151 - blood transfusion started.    1331 - transfusion completed    1609 - Patient medically cleared by MD. Went over After Visit Summary with Patient and answered questions. Patient verbalized understanding. Removed IV and transportation requested.

## 2024-04-24 NOTE — DISCHARGE SUMMARY
Discharge Summary    CHIEF COMPLAINT ON ADMISSION  No chief complaint on file.      Reason for Admission  Post obstructive pneumonia     Admission Date  4/22/2024    CODE STATUS  Full Code    HPI & HOSPITAL COURSE  Meme Hurd is a 63 y.o. female admitted from [place] on 4/22/2024 with Hypoxemic respiratory failure. She has a history of laryngeal cancer and has been undergoing chemotherapy.    Procalcitonin was 3.64.  Pulmonology were consulted. Zosyn was empirically started for presumed aspiration pneumonia. Patient was initially planned for bronchoscopy during this admission, decision was changed to re-image in 8-10 weeks, and do bronchoscopy if needed as an outpatient, as patient's symptoms are though to be due to aspiration pneumonia.      Patient is on 3 L/min nasal cannula oxygen.  Home oxygen and front wheel walker have been ordered. Patient will complete antibiotic course with Augmentin for 4 more days.     Hemoglobin has been chronically low and was 6.6 today, 1 unit of blood was given, Hgb improved to 8.1.    Therefore, she is discharged in fair and stable condition to home with close outpatient follow-up.    Discharge Date  4/24/2024    FOLLOW UP ITEMS POST DISCHARGE  PCP and Oncology within the next few days    DISCHARGE DIAGNOSES  Principal Problem:    Post-obstructive pneumonia due to foreign body aspiration (POA: Yes)  Active Problems:    Laryngeal cancer (HCC) (POA: Yes)    History of tobacco use (POA: Yes)    Thrombocytopenia (HCC) (POA: Yes)    Acute hypoxic respiratory failure (HCC) (POA: Yes)    Regular sinus tachycardia (POA: Yes)    Acute on chronic anemia (POA: Yes)    Aspiration pneumonia of right lower lobe (HCC) (POA: Unknown)    Full code status (POA: Yes)  Resolved Problems:    * No resolved hospital problems. *      FOLLOW UP  No future appointments.  No follow-up provider specified.    MEDICATIONS ON DISCHARGE     Medication List        START taking these medications         Instructions   amoxicillin-clavulanate 875-125 MG Tabs  Commonly known as: Augmentin   Take 1 Tablet by mouth 2 times a day for 4 days.  Dose: 1 Tablet            CHANGE how you take these medications        Instructions   magic mouthwash  What changed: when to take this   Take 5 mL by mouth every day for 30 days. Shake well  Dose: 5 mL            CONTINUE taking these medications        Instructions   acetaminophen 500 MG Tabs  Commonly known as: Tylenol   Take 500 mg by mouth every 6 hours as needed. Indications: Pain  Dose: 500 mg     BIOTIN PO   Take 1 Spray by mouth as needed (For dry mouth).  Dose: 1 Spray     diazePAM 5 MG Tabs  Commonly known as: Valium   Take 5 mg by mouth 2 times a day. Indications: neck and / or throat spasms  Dose: 5 mg     gabapentin 300 MG Caps  Commonly known as: Neurontin   Take 300 mg by mouth 3 times a day.  Dose: 300 mg     HYDROcodone/acetaminophen  MG Tabs  Commonly known as: Norco   Take 1-2 Tablets by mouth 2 times a day. Indications: Pain  Dose: 1-2 Tablet     levothyroxine 50 MCG Tabs  Commonly known as: Synthroid   Take 50 mcg by mouth every morning on an empty stomach.  Dose: 50 mcg     MULTIVITAMIN GUMMIES ADULTS PO   Take 2 Tablets by mouth every day.  Dose: 2 Tablet              Allergies  No Known Allergies    DIET  Orders Placed This Encounter   Procedures    Diet Order Diet: Level 6 - Soft and Bite Sized; Liquid level: Level 1 - Slightly Thick ([facility] Only)     Standing Status:   Standing     Number of Occurrences:   1     Order Specific Question:   Diet:     Answer:   Level 6 - Soft and Bite Sized [23]     Order Specific Question:   Liquid level     Answer:   Level 1 - Slightly Thick ([facility] Only)       ACTIVITY  As tolerated.    CONSULTATIONS  Pulmonology    PROCEDURES  None    LABORATORY  Lab Results   Component Value Date    SODIUM 132 (L) 04/24/2024    POTASSIUM 3.3 (L) 04/24/2024    CHLORIDE 96 04/24/2024    CO2 28 04/24/2024    GLUCOSE 93 04/24/2024     BUN 19 04/24/2024    CREATININE 0.63 04/24/2024        Lab Results   Component Value Date    WBC 2.4 (L) 04/24/2024    HEMOGLOBIN 8.1 (L) 04/24/2024    HEMATOCRIT 25.3 (L) 04/24/2024    PLATELETCT 100 (L) 04/24/2024        Total time of the discharge process exceeds 38 minutes.

## 2024-04-24 NOTE — DISCHARGE PLANNING
Received Choice Form at: 827  Agency/Facility Name: [facility] Referral per Choice Form at: 913    CM RN notified

## 2024-04-24 NOTE — THERAPY
Occupational Therapy Contact Note    Patient Name: Meme Hurd  Age:  63 y.o., Sex:  female  Medical Record #: [id number]  Today's Date: 4/24/2024 04/24/24 0927   Initial Contact Note    Initial Contact Note Order Received and Verified, Occupational Therapy Evaluation in Progress with Full Report to Follow.   Interdisciplinary Plan of Care Collaboration   IDT Collaboration with  Nursing   Collaboration Comments OT eval attempted- Hold for now per RN 2/2 pt getting ultrasound guided IV and needs blood transfusion.  Will monitor and see later in the day as able

## 2024-04-24 NOTE — DISCHARGE PLANNING
Case Management Discharge Planning    Admission Date: 4/22/2024  GMLOS: 5  ALOS: 2    6-Clicks ADL Score: 22  6-Clicks Mobility Score: 18      Anticipated Discharge Dispo: Discharge Disposition: Discharged to home/self care (01)  Discharge Address: [address] [facility]  [address]  Discharge Contact Phone Number: [phone]    DME Needed: Yes    DME Ordered: Yes    Action(s) Taken: Updated Provider/Nurse on Discharge Plan  At 0915, I met with patient at bedside to ensure she had her oxygen from [facility]. She has a unit called [facility], which functions as a portable and home unit. Bedside RN confirmed the unit is fully charged, if patient is discharged. There is a question about discharge today, due to some abnormal labs.   Bedside RN informed me  was here, but I did not see him.  will transport patient home.   1035 Discussed patient in IDT. Patient is receiving transfusion. Hgb will be checked two hours post infusion and if high enough, anticipate patient will discharge.     Escalations Completed: Provider    Medically Clear: Yes - pending confirmation from provider.    Next Steps: Follow for any additional needs.     Barriers to Discharge: Medical clearance - Bedside RN checking with Dr. Fabian.    Is the patient up for discharge tomorrow: No

## 2024-04-24 NOTE — DISCHARGE PLANNING
Per CM RN, DPA placed call to [facility] to get approval on referral request. Katelyn at [facility] states everything was delivered to PT 's bedside 4/23/24 in the afternoon.   DPA will verify delivery to PT's Room.    DANIELLA RN notified

## 2024-04-25 LAB
BACTERIA SPEC RESP CULT: ABNORMAL
BACTERIA SPEC RESP CULT: ABNORMAL
GRAM STN SPEC: ABNORMAL
SIGNIFICANT IND 70042: ABNORMAL
SITE SITE: ABNORMAL
SOURCE SOURCE: ABNORMAL

## 2024-04-27 LAB
BACTERIA BLD CULT: NORMAL
BACTERIA BLD CULT: NORMAL
SIGNIFICANT IND 70042: NORMAL
SIGNIFICANT IND 70042: NORMAL
SITE SITE: NORMAL
SITE SITE: NORMAL
SOURCE SOURCE: NORMAL
SOURCE SOURCE: NORMAL

## 2024-06-26 ENCOUNTER — HOSPITAL ENCOUNTER (INPATIENT)
Facility: MEDICAL CENTER | Age: 64
DRG: 871 | End: 2024-06-26
Attending: INTERNAL MEDICINE | Admitting: INTERNAL MEDICINE
Payer: COMMERCIAL

## 2024-06-26 ENCOUNTER — APPOINTMENT (OUTPATIENT)
Dept: RADIOLOGY | Facility: MEDICAL CENTER | Age: 64
DRG: 871 | End: 2024-06-26
Attending: INTERNAL MEDICINE
Payer: COMMERCIAL

## 2024-06-26 DIAGNOSIS — J18.9 POST-OBSTRUCTIVE PNEUMONIA DUE TO FOREIGN BODY ASPIRATION: ICD-10-CM

## 2024-06-26 DIAGNOSIS — T17.908S POST-OBSTRUCTIVE PNEUMONIA DUE TO FOREIGN BODY ASPIRATION: ICD-10-CM

## 2024-06-26 PROBLEM — J96.91 HYPOXIC RESPIRATORY FAILURE (HCC): Status: ACTIVE | Noted: 2024-01-01

## 2024-06-26 LAB
ANION GAP SERPL CALC-SCNC: 15 MMOL/L (ref 7–16)
ANION GAP SERPL CALC-SCNC: 20 MMOL/L (ref 7–16)
ANISOCYTOSIS BLD QL SMEAR: ABNORMAL
BASE EXCESS BLDA CALC-SCNC: 4 MMOL/L (ref -4–3)
BASOPHILS # BLD AUTO: 0 % (ref 0–1.8)
BASOPHILS # BLD: 0 K/UL (ref 0–0.12)
BODY TEMPERATURE: ABNORMAL DEGREES
BREATHS SETTING VENT: 18
BUN SERPL-MCNC: 17 MG/DL (ref 8–22)
BUN SERPL-MCNC: 17 MG/DL (ref 8–22)
CALCIUM SERPL-MCNC: 7.8 MG/DL (ref 8.4–10.2)
CALCIUM SERPL-MCNC: 8.1 MG/DL (ref 8.4–10.2)
CHLORIDE SERPL-SCNC: 83 MMOL/L (ref 96–112)
CHLORIDE SERPL-SCNC: 84 MMOL/L (ref 96–112)
CO2 BLDA-SCNC: 30 MMOL/L (ref 20–33)
CO2 SERPL-SCNC: 20 MMOL/L (ref 20–33)
CO2 SERPL-SCNC: 28 MMOL/L (ref 20–33)
CREAT SERPL-MCNC: 0.5 MG/DL (ref 0.5–1.4)
CREAT SERPL-MCNC: 0.54 MG/DL (ref 0.5–1.4)
DACRYOCYTES BLD QL SMEAR: NORMAL
DELSYS IDSYS: ABNORMAL
EOSINOPHIL # BLD AUTO: 0 K/UL (ref 0–0.51)
EOSINOPHIL NFR BLD: 0 % (ref 0–6.9)
ERYTHROCYTE [DISTWIDTH] IN BLOOD BY AUTOMATED COUNT: 50.1 FL (ref 35.9–50)
ERYTHROCYTE [DISTWIDTH] IN BLOOD BY AUTOMATED COUNT: 50.1 FL (ref 35.9–50)
GFR SERPLBLD CREATININE-BSD FMLA CKD-EPI: 102 ML/MIN/1.73 M 2
GFR SERPLBLD CREATININE-BSD FMLA CKD-EPI: 104 ML/MIN/1.73 M 2
GLUCOSE SERPL-MCNC: 138 MG/DL (ref 65–99)
GLUCOSE SERPL-MCNC: 82 MG/DL (ref 65–99)
HCO3 BLDA-SCNC: 29 MMOL/L (ref 17–25)
HCT VFR BLD AUTO: 21.5 % (ref 37–47)
HCT VFR BLD AUTO: 21.5 % (ref 37–47)
HGB BLD-MCNC: 7.7 G/DL (ref 12–16)
HGB BLD-MCNC: 7.7 G/DL (ref 12–16)
HOROWITZ INDEX BLDA+IHG-RTO: 130 MM[HG]
LACTATE BLD-SCNC: 2.6 MMOL/L (ref 0.5–2)
LACTATE SERPL-SCNC: 6.4 MMOL/L (ref 0.5–2)
LG PLATELETS BLD QL SMEAR: NORMAL
LYMPHOCYTES # BLD AUTO: 0.22 K/UL (ref 1–4.8)
LYMPHOCYTES NFR BLD: 37 % (ref 22–41)
MACROCYTES BLD QL SMEAR: ABNORMAL
MAGNESIUM SERPL-MCNC: 1.3 MG/DL (ref 1.5–2.5)
MAGNESIUM SERPL-MCNC: 1.6 MG/DL (ref 1.5–2.5)
MANUAL DIFF BLD: ABNORMAL
MCH RBC QN AUTO: 34.1 PG (ref 27–33)
MCH RBC QN AUTO: 34.1 PG (ref 27–33)
MCHC RBC AUTO-ENTMCNC: 35.8 G/DL (ref 32.2–35.5)
MCHC RBC AUTO-ENTMCNC: 35.8 G/DL (ref 32.2–35.5)
MCV RBC AUTO: 95.1 FL (ref 81.4–97.8)
MCV RBC AUTO: 95.1 FL (ref 81.4–97.8)
METAMYELOCYTES NFR BLD MANUAL: 1 %
MODE IMODE: ABNORMAL
MONOCYTES # BLD AUTO: 0.02 K/UL (ref 0–0.85)
MONOCYTES NFR BLD AUTO: 4 % (ref 0–13.4)
NEUTROPHILS # BLD AUTO: 0.35 K/UL (ref 1.82–7.42)
NEUTROPHILS NFR BLD: 42 % (ref 44–72)
NEUTS BAND NFR BLD MANUAL: 16 % (ref 0–10)
NRBC # BLD AUTO: 0.02 K/UL
NRBC BLD-RTO: 3 /100 WBC (ref 0–0.2)
O2/TOTAL GAS SETTING VFR VENT: 40 %
OVALOCYTES BLD QL SMEAR: NORMAL
PCO2 BLDA: 45.7 MMHG (ref 26–37)
PEEP END EXPIRATORY PRESSURE IPEEP: 8 CMH20
PH BLDA: 7.41 [PH] (ref 7.4–7.5)
PH TEMP ADJ BLDA: 7.41 [PH] (ref 7.4–7.5)
PHOSPHATE SERPL-MCNC: 3 MG/DL (ref 2.5–4.5)
PLATELET # BLD AUTO: 65 K/UL (ref 164–446)
PLATELET # BLD AUTO: 65 K/UL (ref 164–446)
PLATELET BLD QL SMEAR: NORMAL
PLATELETS.RETICULATED NFR BLD AUTO: 4.5 % (ref 0.6–13.1)
PMV BLD AUTO: 12.1 FL (ref 9–12.9)
PMV BLD AUTO: 12.1 FL (ref 9–12.9)
PO2 BLDA: 52 MMHG (ref 64–87)
PO2 TEMP ADJ BLDA: 52 MMHG (ref 64–87)
POLYCHROMASIA BLD QL SMEAR: NORMAL
POTASSIUM SERPL-SCNC: 2.9 MMOL/L (ref 3.6–5.5)
POTASSIUM SERPL-SCNC: 3.2 MMOL/L (ref 3.6–5.5)
RBC # BLD AUTO: 2.26 M/UL (ref 4.2–5.4)
RBC # BLD AUTO: 2.26 M/UL (ref 4.2–5.4)
RBC BLD AUTO: PRESENT
SAO2 % BLDA: 86 % (ref 93–99)
SCCMEC + MECA PNL NOSE NAA+PROBE: NEGATIVE
SODIUM SERPL-SCNC: 124 MMOL/L (ref 135–145)
SODIUM SERPL-SCNC: 126 MMOL/L (ref 135–145)
SPECIMEN DRAWN FROM PATIENT: ABNORMAL
TARGETS BLD QL SMEAR: NORMAL
TIDAL VOLUME IVT: 400 ML
WBC # BLD AUTO: 0.6 K/UL (ref 4.8–10.8)
WBC # BLD AUTO: 0.6 K/UL (ref 4.8–10.8)

## 2024-06-26 PROCEDURE — 84100 ASSAY OF PHOSPHORUS: CPT

## 2024-06-26 PROCEDURE — 94002 VENT MGMT INPAT INIT DAY: CPT

## 2024-06-26 PROCEDURE — 700105 HCHG RX REV CODE 258: Performed by: INTERNAL MEDICINE

## 2024-06-26 PROCEDURE — 70450 CT HEAD/BRAIN W/O DYE: CPT

## 2024-06-26 PROCEDURE — 83735 ASSAY OF MAGNESIUM: CPT | Mod: 91

## 2024-06-26 PROCEDURE — 94760 N-INVAS EAR/PLS OXIMETRY 1: CPT

## 2024-06-26 PROCEDURE — 80048 BASIC METABOLIC PNL TOTAL CA: CPT

## 2024-06-26 PROCEDURE — 700111 HCHG RX REV CODE 636 W/ 250 OVERRIDE (IP): Mod: JZ | Performed by: INTERNAL MEDICINE

## 2024-06-26 PROCEDURE — 83605 ASSAY OF LACTIC ACID: CPT

## 2024-06-26 PROCEDURE — 36600 WITHDRAWAL OF ARTERIAL BLOOD: CPT

## 2024-06-26 PROCEDURE — 85007 BL SMEAR W/DIFF WBC COUNT: CPT

## 2024-06-26 PROCEDURE — 94799 UNLISTED PULMONARY SVC/PX: CPT

## 2024-06-26 PROCEDURE — 87641 MR-STAPH DNA AMP PROBE: CPT

## 2024-06-26 PROCEDURE — 85027 COMPLETE CBC AUTOMATED: CPT

## 2024-06-26 PROCEDURE — 700101 HCHG RX REV CODE 250: Performed by: INTERNAL MEDICINE

## 2024-06-26 PROCEDURE — 5A1935Z RESPIRATORY VENTILATION, LESS THAN 24 CONSECUTIVE HOURS: ICD-10-PCS | Performed by: INTERNAL MEDICINE

## 2024-06-26 PROCEDURE — 99291 CRITICAL CARE FIRST HOUR: CPT | Performed by: INTERNAL MEDICINE

## 2024-06-26 PROCEDURE — 770022 HCHG ROOM/CARE - ICU (200)

## 2024-06-26 PROCEDURE — 700105 HCHG RX REV CODE 258: Performed by: HOSPITALIST

## 2024-06-26 PROCEDURE — 99291 CRITICAL CARE FIRST HOUR: CPT | Performed by: HOSPITALIST

## 2024-06-26 PROCEDURE — 700105 HCHG RX REV CODE 258

## 2024-06-26 PROCEDURE — 700101 HCHG RX REV CODE 250

## 2024-06-26 PROCEDURE — 85055 RETICULATED PLATELET ASSAY: CPT

## 2024-06-26 PROCEDURE — 82803 BLOOD GASES ANY COMBINATION: CPT

## 2024-06-26 RX ORDER — ENOXAPARIN SODIUM 100 MG/ML
40 INJECTION SUBCUTANEOUS DAILY
Status: DISCONTINUED | OUTPATIENT
Start: 2024-06-26 | End: 2024-06-27

## 2024-06-26 RX ORDER — AMOXICILLIN 250 MG
2 CAPSULE ORAL EVERY EVENING
Status: DISCONTINUED | OUTPATIENT
Start: 2024-06-26 | End: 2024-06-26

## 2024-06-26 RX ORDER — MAGNESIUM SULFATE HEPTAHYDRATE 40 MG/ML
4 INJECTION, SOLUTION INTRAVENOUS ONCE
Status: COMPLETED | OUTPATIENT
Start: 2024-06-26 | End: 2024-06-26

## 2024-06-26 RX ORDER — NOREPINEPHRINE BITARTRATE 0.03 MG/ML
0-1 INJECTION, SOLUTION INTRAVENOUS CONTINUOUS
Status: DISCONTINUED | OUTPATIENT
Start: 2024-06-26 | End: 2024-06-28

## 2024-06-26 RX ORDER — SODIUM CHLORIDE 9 MG/ML
500 INJECTION, SOLUTION INTRAVENOUS ONCE
Status: COMPLETED | OUTPATIENT
Start: 2024-06-26 | End: 2024-06-27

## 2024-06-26 RX ORDER — FAMOTIDINE 20 MG/1
20 TABLET, FILM COATED ORAL EVERY 12 HOURS
Status: DISCONTINUED | OUTPATIENT
Start: 2024-06-26 | End: 2024-06-27

## 2024-06-26 RX ORDER — POTASSIUM CHLORIDE 7.45 MG/ML
10 INJECTION INTRAVENOUS ONCE
Status: COMPLETED | OUTPATIENT
Start: 2024-06-27 | End: 2024-06-27

## 2024-06-26 RX ORDER — SODIUM CHLORIDE 9 MG/ML
1000 INJECTION, SOLUTION INTRAVENOUS ONCE
Status: COMPLETED | OUTPATIENT
Start: 2024-06-26 | End: 2024-06-26

## 2024-06-26 RX ORDER — NOREPINEPHRINE BITARTRATE 0.03 MG/ML
INJECTION, SOLUTION INTRAVENOUS
Status: COMPLETED
Start: 2024-06-26 | End: 2024-06-26

## 2024-06-26 RX ORDER — METHYLPREDNISOLONE SODIUM SUCCINATE 40 MG/ML
40 INJECTION, POWDER, LYOPHILIZED, FOR SOLUTION INTRAMUSCULAR; INTRAVENOUS EVERY 12 HOURS
Status: DISCONTINUED | OUTPATIENT
Start: 2024-06-26 | End: 2024-06-28

## 2024-06-26 RX ORDER — LEVOTHYROXINE SODIUM 0.05 MG/1
50 TABLET ORAL
Status: DISCONTINUED | OUTPATIENT
Start: 2024-06-27 | End: 2024-06-29

## 2024-06-26 RX ORDER — POLYETHYLENE GLYCOL 3350 17 G/17G
1 POWDER, FOR SOLUTION ORAL
Status: DISCONTINUED | OUTPATIENT
Start: 2024-06-26 | End: 2024-06-28

## 2024-06-26 RX ORDER — AMOXICILLIN 250 MG
2 CAPSULE ORAL 2 TIMES DAILY
Status: DISCONTINUED | OUTPATIENT
Start: 2024-06-26 | End: 2024-06-28

## 2024-06-26 RX ORDER — ACETAMINOPHEN 325 MG/1
650 TABLET ORAL EVERY 6 HOURS PRN
Status: DISCONTINUED | OUTPATIENT
Start: 2024-06-26 | End: 2024-06-28

## 2024-06-26 RX ORDER — LINEZOLID 2 MG/ML
600 INJECTION, SOLUTION INTRAVENOUS EVERY 12 HOURS
Status: DISCONTINUED | OUTPATIENT
Start: 2024-06-26 | End: 2024-06-27

## 2024-06-26 RX ORDER — BISACODYL 10 MG
10 SUPPOSITORY, RECTAL RECTAL
Status: DISCONTINUED | OUTPATIENT
Start: 2024-06-26 | End: 2024-06-28

## 2024-06-26 RX ORDER — DEXMEDETOMIDINE HYDROCHLORIDE 4 UG/ML
.1-1.5 INJECTION, SOLUTION INTRAVENOUS CONTINUOUS
Status: DISCONTINUED | OUTPATIENT
Start: 2024-06-26 | End: 2024-06-28

## 2024-06-26 RX ORDER — POTASSIUM CHLORIDE 7.45 MG/ML
10 INJECTION INTRAVENOUS
Status: COMPLETED | OUTPATIENT
Start: 2024-06-26 | End: 2024-06-26

## 2024-06-26 RX ORDER — POLYETHYLENE GLYCOL 3350 17 G/17G
1 POWDER, FOR SOLUTION ORAL
Status: DISCONTINUED | OUTPATIENT
Start: 2024-06-26 | End: 2024-06-26

## 2024-06-26 RX ADMIN — POTASSIUM CHLORIDE 10 MEQ: 10 INJECTION, SOLUTION INTRAVENOUS at 21:17

## 2024-06-26 RX ADMIN — FENTANYL CITRATE 25 MCG: 50 INJECTION, SOLUTION INTRAMUSCULAR; INTRAVENOUS at 18:07

## 2024-06-26 RX ADMIN — LINEZOLID 600 MG: 600 INJECTION INTRAVENOUS at 17:59

## 2024-06-26 RX ADMIN — DEXMEDETOMIDINE HYDROCHLORIDE 0.2 MCG/KG/HR: 4 INJECTION, SOLUTION INTRAVENOUS at 17:53

## 2024-06-26 RX ADMIN — FAMOTIDINE 20 MG: 10 INJECTION, SOLUTION INTRAVENOUS at 17:56

## 2024-06-26 RX ADMIN — ENOXAPARIN SODIUM 40 MG: 100 INJECTION SUBCUTANEOUS at 17:54

## 2024-06-26 RX ADMIN — CEFEPIME 2 G: 2 INJECTION, POWDER, FOR SOLUTION INTRAVENOUS at 18:12

## 2024-06-26 RX ADMIN — CEFEPIME 2 G: 2 INJECTION, POWDER, FOR SOLUTION INTRAVENOUS at 21:28

## 2024-06-26 RX ADMIN — POTASSIUM CHLORIDE 10 MEQ: 10 INJECTION, SOLUTION INTRAVENOUS at 20:00

## 2024-06-26 RX ADMIN — POTASSIUM CHLORIDE 10 MEQ: 10 INJECTION, SOLUTION INTRAVENOUS at 18:51

## 2024-06-26 RX ADMIN — METHYLPREDNISOLONE SODIUM SUCCINATE 40 MG: 40 INJECTION, POWDER, FOR SOLUTION INTRAMUSCULAR; INTRAVENOUS at 18:05

## 2024-06-26 RX ADMIN — NOREPINEPHRINE BITARTRATE 0.15 MCG/KG/MIN: 1 INJECTION, SOLUTION, CONCENTRATE INTRAVENOUS at 16:30

## 2024-06-26 RX ADMIN — MAGNESIUM SULFATE HEPTAHYDRATE 4 G: 4 INJECTION, SOLUTION INTRAVENOUS at 18:54

## 2024-06-26 RX ADMIN — SODIUM CHLORIDE 500 ML: 9 INJECTION, SOLUTION INTRAVENOUS at 23:26

## 2024-06-26 RX ADMIN — POTASSIUM CHLORIDE 10 MEQ: 10 INJECTION, SOLUTION INTRAVENOUS at 22:00

## 2024-06-26 RX ADMIN — NOREPINEPHRINE BITARTRATE 0.2 MCG/KG/MIN: 1 INJECTION, SOLUTION, CONCENTRATE INTRAVENOUS at 20:57

## 2024-06-26 RX ADMIN — SODIUM CHLORIDE 1000 ML: 9 INJECTION, SOLUTION INTRAVENOUS at 18:45

## 2024-06-26 ASSESSMENT — PAIN DESCRIPTION - PAIN TYPE
TYPE: ACUTE PAIN

## 2024-06-26 NOTE — CONSULTS
Pulmonary & Critical Care Consultation    Date of consult: 6/26/2024    Referring Physician  Yani Jackson M.D.    Reason for Consultation  Respiratory failure    History of Presenting Illness  63 y.o. female who presented 6/26/2024 with a history of metastatic laryngeal CA and recurrent pneumonia who presented to [facility] today brought in by EMS after being found down at home with an oxygen saturation of 36%.  Reportedly she was normal about 2 hours prior to that and was initially found by her .  She was admitted to [facility] 2 months ago with a RLL pneumonia and sent home on Augmentin. She has reportedly been on antibiotics for almost the entire two months since that admission.  Initially report was that she was alerted and somnolent but report from [facility] is that she had been completely unresponsive from discovery at home until arrival here at [facility].  We will obtain a stat CT head.  She is on only 30% FiO2 on arrival, intubated and saturating well on a small amount of levophed via her chest port.  She is actively undergoing chemotherapy and is neutropenic.     Code Status  Full Code    Review of Systems  Review of Systems   Unable to perform ROS: Intubated       Past Medical History   has a past medical history of Cancer (HCC).    Surgical History   has no past surgical history on file.    Family History  family history is not on file.    Social History   reports that she has quit smoking. Her smoking use included cigarettes. She started smoking about 8 years ago. She does not have any smokeless tobacco history on file.    Medications  Home Medications    **Home medications have not yet been reviewed for this encounter**       Audit from Redirected Encounters    **Home medications have not yet been reviewed for this encounter**       Current Facility-Administered Medications   Medication Dose Route Frequency Provider Last Rate Last Admin    NOREPINEPHRINE-SODIUM CHLORIDE  8-0.9 MG/250ML-% IV SOLN             enoxaparin (Lovenox) inj 40 mg  40 mg Subcutaneous DAILY AT 1800 Yani Jackson M.D.        acetaminophen (Tylenol) tablet 650 mg  650 mg Enteral Tube Q6HRS PRN Yani Jackson M.D.        Respiratory Therapy Consult   Nebulization Continuous RT Yani Jackson M.D.        famotidine (Pepcid) tablet 20 mg  20 mg Enteral Tube Q12HRS Yani Jackson M.D.        Or    famotidine (Pepcid) injection 20 mg  20 mg Intravenous Q12HRS Ynai Jackson M.D.        senna-docusate (Pericolace Or Senokot S) 8.6-50 MG per tablet 2 Tablet  2 Tablet Enteral Tube BID Yani Jackson M.D.        And    polyethylene glycol/lytes (Miralax) Packet 1 Packet  1 Packet Enteral Tube QDAY PRN Yani Jackson M.D.        And    magnesium hydroxide (Milk Of Magnesia) suspension 30 mL  30 mL Enteral Tube QDAY PRN Yani Jackson M.D.        And    bisacodyl (Dulcolax) suppository 10 mg  10 mg Rectal QDAY PRN Yani Jackson M.D. MD Alert...ICU Electrolyte Replacement per Pharmacy   Other PHARMACY TO DOSE Yani Jackson M.D.        lidocaine (Xylocaine) 1 % injection 2 mL  2 mL Tracheal Tube Q30 MIN PRN Yani Jackson M.D.        cefepime (Maxipime) 2 g in  mL IVPB  2 g Intravenous Q8HRS Yani Jackson M.D.        Linezolid (Zyvox) premix 600 mg  600 mg Intravenous Q12HRS Yani Jackson M.D.           Allergies  No Known Allergies    Vital Signs last 24 hours  Temp:  [36.9 °C (98.4 °F)] 36.9 °C (98.4 °F)  Pulse:  [80-84] 84  Resp:  [18] 18  BP: (128)/(65) 128/65  SpO2:  [97 %] 97 %    Physical Exam  Physical Exam  Constitutional:       Appearance: She is ill-appearing.      Comments: Emaciated    HENT:      Head: Atraumatic.   Cardiovascular:      Rate and Rhythm: Normal rate and regular rhythm.   Pulmonary:      Effort: Pulmonary effort is normal. No respiratory distress.      Breath sounds: No wheezing or rales.   Abdominal:      General: Abdomen is flat.    Musculoskeletal:         General: No swelling.   Skin:     General: Skin is warm and dry.   Neurological:      General: No focal deficit present.      Mental Status: She is alert and oriented to person, place, and time.         Fluids    Intake/Output Summary (Last 24 hours) at 6/26/2024 1607  Last data filed at 6/26/2024 1600  Gross per 24 hour   Intake --   Output 650 ml   Net -650 ml       Laboratory  No results found for this or any previous visit (from the past 48 hour(s)).    Imaging  CT-HEAD W/O   Final Result      No definite acute intracranial abnormality although evaluation is limited by patient motion.               DX-CHEST-PORTABLE (1 VIEW)   Final Result      Endotracheal tube is in good position. There are bibasilar infiltrates, right worse than left.      OUTSIDE IMAGES-DX CHEST   Final Result      OUTSIDE IMAGES-CT CERVICAL SPINE   Final Result          Assessment/Plan  #Ventilator Dependent Respiratory Failure  #RLL pneumonia  Plan:  - Daily SAT/SBT per protocol  - PRN ABG  - Elevate HOB to 30 degrees  - Chlorhexidine rinse to decrease VAP incidence  - Maintain O2 saturation > 90%   - Tube placement verified  - Pepcid for GI prophylaxis  - Sedation with precedex for RASS +1 to -1  - Monitor triglycerides periodically   - Analgesia with Fentanyl pushes PRN for CPOT < 2  - Restraints to prevent self extubation  - Utilizing lung protective ventilation with tidal volume 6ml/kg  - Monitoring peak and plateau pressures  - Cover with linezolid and cefepime for HAP and Prior Enterobacter infection   - Solumedrol 40 BID    #Septic shock 2/2 pneumonia present on admission  Plan:  - Titrate Levophed to goal  - MAP goal > 65  - Fluid resuscitated with 2L NS at OSH  - Trend lactate PRN    #Unresponsive on admission  Plan:  - Stat CT head with motion artifact  but essentially negative  - Patient woke up off sedation and responds, moves all extremities.     #History of Metastatic Laryngeal CA currently on  chemotherapy  Plan:  - Monitor cell counts    #History of aspiration since radiation and resection for her cancer treatment  Plan:  - Aspiration precautions    #History of COPD  Plan:  - Solumedrol 40mg BID   - Duonebs as needed    #Hypothyroidism  Plan:  - Restarted home synthroid for tomorrow am       Discussed patient condition and risk of morbidity and/or mortality with RN, RT, and Pharmacy.    The patient remains critically ill.  Critical care time = 50 minutes in directly providing and coordinating critical care and extensive data review.  No time overlap and excludes procedures.      Yani Jackson MD RD  Pulmonary and Critical Care    Available on Voalte

## 2024-06-26 NOTE — PROGRESS NOTES
Pulmonary Critical Care     ICU accept Note      Referring Facility: [facility]    Referring Physician: Dr. Wolfe    Patient Demographics: 63 F    PMHx: Metastatic laryngeal cancer with history of recent admission for post obstructive pneumonia.     Current Course: Was reportedly on antibiotics until recently.  Found by  at home obtunded, sats in 40's.  Intubated on arrival with ABG 7.32/72/93 prior to intubation.  Now on Vt 450, rate 12, FiO2 100% and peep of 5 satting in the high 90's. Neutropenic, with low potassium and magnesium (currently being replaced).  CXR with RLL pneumonia, given vanc and zosyn.  Pressures now low s/p 2L of saline, starting pressors and running more fluids.  Trop < 0.1.     Reason for Transfer:higher level of care    Pressors: starting levo    Respiratory Status: Stable on vent    Accepted: yes    Yani Jackson MD RD  Pulmonary and Critical Care    Available on Voalte

## 2024-06-26 NOTE — PROGRESS NOTES
4 Eyes Skin Assessment Completed by Pavel RN and YRN Womack.    Head WDL  Ears WDL  Nose WDL  Mouth WDL  Neck L neck spot    Breast/Chest WDL  Shoulder Blades Redness and Blanching  Spine Redness and Blanching  (R) Arm/Elbow/Hand Bruising  (L) Arm/Elbow/Hand Bruising  Abdomen WDL  Groin Redness and Blanching  Scrotum/Coccyx/Buttocks Non-Blanching, Excoriation, and Moisture Fissure      (R) Leg Scar, Scab, and Bruising    (L) Leg WDL  (R) Heel/Foot/Toe Boggy  (L) Heel/Foot/Toe Boggy        Devices In Places ECG, Blood Pressure Cuff, Pulse Ox, Gilbert, SCD's, and ET Tube  Interventions In Place Sacral Mepilex, Heel Float Boots, TAP System, Pillows, Q2 Turns, Low Air Loss Mattress, and Heels Loaded W/Pillows  Possible Skin Injury Yes  Pictures Uploaded Into Epic Yes  Wound Consult Placed Yes  RN Wound Prevention Protocol Ordered Yes

## 2024-06-27 LAB
ABO GROUP BLD: NORMAL
ALBUMIN SERPL BCP-MCNC: 3 G/DL (ref 3.2–4.9)
ALBUMIN/GLOB SERPL: 1.3 G/DL
ALP SERPL-CCNC: 47 U/L (ref 30–99)
ALT SERPL-CCNC: 9 U/L (ref 2–50)
ANION GAP SERPL CALC-SCNC: 10 MMOL/L (ref 7–16)
ANISOCYTOSIS BLD QL SMEAR: NORMAL
ARTERIAL PATENCY WRIST A: ABNORMAL
AST SERPL-CCNC: 16 U/L (ref 12–45)
B PARAP IS1001 DNA NPH QL NAA+NON-PROBE: NOT DETECTED
B PERT.PT PRMT NPH QL NAA+NON-PROBE: NOT DETECTED
BARCODED ABORH UBTYP: 6200
BARCODED PRD CODE UBPRD: NORMAL
BARCODED UNIT NUM UBUNT: NORMAL
BASE EXCESS BLDA CALC-SCNC: 3 MMOL/L (ref -4–3)
BILIRUB SERPL-MCNC: 0.6 MG/DL (ref 0.1–1.5)
BLD GP AB SCN SERPL QL: NORMAL
BODY TEMPERATURE: ABNORMAL DEGREES
BREATHS SETTING VENT: 22
BUN SERPL-MCNC: 16 MG/DL (ref 8–22)
C PNEUM DNA NPH QL NAA+NON-PROBE: NOT DETECTED
CALCIUM ALBUM COR SERPL-MCNC: 8.5 MG/DL (ref 8.5–10.5)
CALCIUM SERPL-MCNC: 7.7 MG/DL (ref 8.4–10.2)
CHLORIDE SERPL-SCNC: 89 MMOL/L (ref 96–112)
CO2 BLDA-SCNC: 26 MMOL/L (ref 20–33)
CO2 SERPL-SCNC: 26 MMOL/L (ref 20–33)
COMPONENT R 8504R: NORMAL
CREAT SERPL-MCNC: 0.5 MG/DL (ref 0.5–1.4)
DACRYOCYTES BLD QL SMEAR: NORMAL
DELSYS IDSYS: ABNORMAL
END TIDAL CARBON DIOXIDE IECO2: 27 MMHG
ERYTHROCYTE [DISTWIDTH] IN BLOOD BY AUTOMATED COUNT: 49.1 FL (ref 35.9–50)
FLUAV RNA NPH QL NAA+NON-PROBE: NOT DETECTED
FLUBV RNA NPH QL NAA+NON-PROBE: NOT DETECTED
GFR SERPLBLD CREATININE-BSD FMLA CKD-EPI: 104 ML/MIN/1.73 M 2
GLOBULIN SER CALC-MCNC: 2.3 G/DL (ref 1.9–3.5)
GLUCOSE BLD STRIP.AUTO-MCNC: 110 MG/DL (ref 65–99)
GLUCOSE BLD STRIP.AUTO-MCNC: 74 MG/DL (ref 65–99)
GLUCOSE BLD STRIP.AUTO-MCNC: 74 MG/DL (ref 65–99)
GLUCOSE BLD STRIP.AUTO-MCNC: 75 MG/DL (ref 65–99)
GLUCOSE BLD STRIP.AUTO-MCNC: 77 MG/DL (ref 65–99)
GLUCOSE SERPL-MCNC: 91 MG/DL (ref 65–99)
GRAM STN SPEC: NORMAL
HADV DNA NPH QL NAA+NON-PROBE: NOT DETECTED
HCO3 BLDA-SCNC: 25.5 MMOL/L (ref 17–25)
HCOV 229E RNA NPH QL NAA+NON-PROBE: NOT DETECTED
HCOV HKU1 RNA NPH QL NAA+NON-PROBE: NOT DETECTED
HCOV NL63 RNA NPH QL NAA+NON-PROBE: NOT DETECTED
HCOV OC43 RNA NPH QL NAA+NON-PROBE: NOT DETECTED
HCT VFR BLD AUTO: 17.3 % (ref 37–47)
HGB BLD-MCNC: 6.3 G/DL (ref 12–16)
HMPV RNA NPH QL NAA+NON-PROBE: NOT DETECTED
HOROWITZ INDEX BLDA+IHG-RTO: 178 MM[HG]
HPIV1 RNA NPH QL NAA+NON-PROBE: NOT DETECTED
HPIV2 RNA NPH QL NAA+NON-PROBE: NOT DETECTED
HPIV3 RNA NPH QL NAA+NON-PROBE: NOT DETECTED
HPIV4 RNA NPH QL NAA+NON-PROBE: NOT DETECTED
LACTATE BLD-SCNC: 1.1 MMOL/L (ref 0.5–2)
LACTATE SERPL-SCNC: 1.5 MMOL/L (ref 0.5–2)
LG PLATELETS BLD QL SMEAR: NORMAL
M PNEUMO DNA NPH QL NAA+NON-PROBE: NOT DETECTED
MACROCYTES BLD QL SMEAR: NORMAL
MAGNESIUM SERPL-MCNC: 2.3 MG/DL (ref 1.5–2.5)
MCH RBC QN AUTO: 34.4 PG (ref 27–33)
MCHC RBC AUTO-ENTMCNC: 36.4 G/DL (ref 32.2–35.5)
MCV RBC AUTO: 94.5 FL (ref 81.4–97.8)
MODE IMODE: ABNORMAL
O2/TOTAL GAS SETTING VFR VENT: 40 %
OVALOCYTES BLD QL SMEAR: NORMAL
PCO2 BLDA: 31 MMHG (ref 26–37)
PEEP END EXPIRATORY PRESSURE IPEEP: 8 CMH20
PH BLDA: 7.52 [PH] (ref 7.4–7.5)
PH TEMP ADJ BLDA: 7.51 [PH] (ref 7.4–7.5)
PHOSPHATE SERPL-MCNC: 1.9 MG/DL (ref 2.5–4.5)
PLATELET # BLD AUTO: 49 K/UL (ref 164–446)
PLATELET BLD QL SMEAR: NORMAL
PLATELETS.RETICULATED NFR BLD AUTO: 4.6 % (ref 0.6–13.1)
PMV BLD AUTO: 10.3 FL (ref 9–12.9)
PO2 BLDA: 71 MMHG (ref 64–87)
PO2 TEMP ADJ BLDA: 77 MMHG (ref 64–87)
POLYCHROMASIA BLD QL SMEAR: NORMAL
POTASSIUM SERPL-SCNC: 3.4 MMOL/L (ref 3.6–5.5)
PROCALCITONIN SERPL-MCNC: 37.69 NG/ML
PRODUCT TYPE UPROD: NORMAL
PROT SERPL-MCNC: 5.3 G/DL (ref 6–8.2)
RBC # BLD AUTO: 1.83 M/UL (ref 4.2–5.4)
RBC BLD AUTO: PRESENT
RH BLD: NORMAL
RSV RNA NPH QL NAA+NON-PROBE: NOT DETECTED
RV+EV RNA NPH QL NAA+NON-PROBE: NOT DETECTED
SAO2 % BLDA: 96 % (ref 93–99)
SARS-COV-2 RNA NPH QL NAA+NON-PROBE: NOTDETECTED
SIGNIFICANT IND 70042: NORMAL
SITE SITE: NORMAL
SODIUM SERPL-SCNC: 125 MMOL/L (ref 135–145)
SOURCE SOURCE: NORMAL
SPECIMEN DRAWN FROM PATIENT: ABNORMAL
TIDAL VOLUME IVT: 400 ML
UNIT STATUS USTAT: NORMAL
WBC # BLD AUTO: 0.9 K/UL (ref 4.8–10.8)

## 2024-06-27 PROCEDURE — 86923 COMPATIBILITY TEST ELECTRIC: CPT

## 2024-06-27 PROCEDURE — 700101 HCHG RX REV CODE 250: Performed by: INTERNAL MEDICINE

## 2024-06-27 PROCEDURE — 84145 PROCALCITONIN (PCT): CPT

## 2024-06-27 PROCEDURE — 82962 GLUCOSE BLOOD TEST: CPT | Mod: 91

## 2024-06-27 PROCEDURE — 99291 CRITICAL CARE FIRST HOUR: CPT | Performed by: INTERNAL MEDICINE

## 2024-06-27 PROCEDURE — 700105 HCHG RX REV CODE 258: Performed by: INTERNAL MEDICINE

## 2024-06-27 PROCEDURE — 770022 HCHG ROOM/CARE - ICU (200)

## 2024-06-27 PROCEDURE — 36600 WITHDRAWAL OF ARTERIAL BLOOD: CPT

## 2024-06-27 PROCEDURE — 84100 ASSAY OF PHOSPHORUS: CPT

## 2024-06-27 PROCEDURE — 700105 HCHG RX REV CODE 258

## 2024-06-27 PROCEDURE — P9016 RBC LEUKOCYTES REDUCED: HCPCS

## 2024-06-27 PROCEDURE — 87186 SC STD MICRODIL/AGAR DIL: CPT | Mod: 91

## 2024-06-27 PROCEDURE — 83735 ASSAY OF MAGNESIUM: CPT

## 2024-06-27 PROCEDURE — 700111 HCHG RX REV CODE 636 W/ 250 OVERRIDE (IP): Mod: JZ | Performed by: HOSPITALIST

## 2024-06-27 PROCEDURE — 85027 COMPLETE CBC AUTOMATED: CPT

## 2024-06-27 PROCEDURE — 82803 BLOOD GASES ANY COMBINATION: CPT

## 2024-06-27 PROCEDURE — 94669 MECHANICAL CHEST WALL OSCILL: CPT

## 2024-06-27 PROCEDURE — 87077 CULTURE AEROBIC IDENTIFY: CPT | Mod: 91

## 2024-06-27 PROCEDURE — 94760 N-INVAS EAR/PLS OXIMETRY 1: CPT

## 2024-06-27 PROCEDURE — 86850 RBC ANTIBODY SCREEN: CPT

## 2024-06-27 PROCEDURE — 86900 BLOOD TYPING SEROLOGIC ABO: CPT

## 2024-06-27 PROCEDURE — 97161 PT EVAL LOW COMPLEX 20 MIN: CPT

## 2024-06-27 PROCEDURE — 700111 HCHG RX REV CODE 636 W/ 250 OVERRIDE (IP): Mod: JZ | Performed by: INTERNAL MEDICINE

## 2024-06-27 PROCEDURE — 36415 COLL VENOUS BLD VENIPUNCTURE: CPT

## 2024-06-27 PROCEDURE — C9113 INJ PANTOPRAZOLE SODIUM, VIA: HCPCS | Performed by: INTERNAL MEDICINE

## 2024-06-27 PROCEDURE — 86901 BLOOD TYPING SEROLOGIC RH(D): CPT

## 2024-06-27 PROCEDURE — 80053 COMPREHEN METABOLIC PANEL: CPT

## 2024-06-27 PROCEDURE — 83605 ASSAY OF LACTIC ACID: CPT

## 2024-06-27 PROCEDURE — 36430 TRANSFUSION BLD/BLD COMPNT: CPT

## 2024-06-27 PROCEDURE — 87070 CULTURE OTHR SPECIMN AEROBIC: CPT

## 2024-06-27 PROCEDURE — 87205 SMEAR GRAM STAIN: CPT

## 2024-06-27 PROCEDURE — 94003 VENT MGMT INPAT SUBQ DAY: CPT

## 2024-06-27 PROCEDURE — 94799 UNLISTED PULMONARY SVC/PX: CPT

## 2024-06-27 PROCEDURE — 85055 RETICULATED PLATELET ASSAY: CPT

## 2024-06-27 PROCEDURE — 700101 HCHG RX REV CODE 250

## 2024-06-27 PROCEDURE — 0202U NFCT DS 22 TRGT SARS-COV-2: CPT

## 2024-06-27 RX ORDER — PANTOPRAZOLE SODIUM 40 MG/10ML
40 INJECTION, POWDER, LYOPHILIZED, FOR SOLUTION INTRAVENOUS 2 TIMES DAILY
Status: DISCONTINUED | OUTPATIENT
Start: 2024-06-27 | End: 2024-06-29

## 2024-06-27 RX ADMIN — FAMOTIDINE 20 MG: 10 INJECTION, SOLUTION INTRAVENOUS at 05:00

## 2024-06-27 RX ADMIN — CEFEPIME 2 G: 2 INJECTION, POWDER, FOR SOLUTION INTRAVENOUS at 05:04

## 2024-06-27 RX ADMIN — LINEZOLID 600 MG: 600 INJECTION INTRAVENOUS at 09:04

## 2024-06-27 RX ADMIN — METHYLPREDNISOLONE SODIUM SUCCINATE 40 MG: 40 INJECTION, POWDER, FOR SOLUTION INTRAMUSCULAR; INTRAVENOUS at 05:01

## 2024-06-27 RX ADMIN — POTASSIUM PHOSPHATE, MONOBASIC AND POTASSIUM PHOSPHATE, DIBASIC 15 MMOL: 224; 236 INJECTION, SOLUTION, CONCENTRATE INTRAVENOUS at 04:37

## 2024-06-27 RX ADMIN — CEFEPIME 2 G: 2 INJECTION, POWDER, FOR SOLUTION INTRAVENOUS at 22:32

## 2024-06-27 RX ADMIN — METHYLPREDNISOLONE SODIUM SUCCINATE 40 MG: 40 INJECTION, POWDER, FOR SOLUTION INTRAMUSCULAR; INTRAVENOUS at 17:48

## 2024-06-27 RX ADMIN — DEXMEDETOMIDINE HYDROCHLORIDE 1.2 MCG/KG/HR: 4 INJECTION, SOLUTION INTRAVENOUS at 00:34

## 2024-06-27 RX ADMIN — CEFEPIME 2 G: 2 INJECTION, POWDER, FOR SOLUTION INTRAVENOUS at 15:43

## 2024-06-27 RX ADMIN — POTASSIUM CHLORIDE 10 MEQ: 10 INJECTION, SOLUTION INTRAVENOUS at 01:03

## 2024-06-27 RX ADMIN — PANTOPRAZOLE SODIUM 40 MG: 40 INJECTION, POWDER, FOR SOLUTION INTRAVENOUS at 17:48

## 2024-06-27 ASSESSMENT — ENCOUNTER SYMPTOMS
FEVER: 0
SPUTUM PRODUCTION: 1
DIZZINESS: 0
SORE THROAT: 1
HEADACHES: 0
COUGH: 1
ABDOMINAL PAIN: 0
SHORTNESS OF BREATH: 0

## 2024-06-27 ASSESSMENT — PAIN DESCRIPTION - PAIN TYPE
TYPE: ACUTE PAIN

## 2024-06-27 NOTE — PROGRESS NOTES
Lab called with critical result of Procalcitonin of 37.69 at 0825. Dr. Jackson notified of critical lab result at 0825.

## 2024-06-27 NOTE — CARE PLAN
The patient is Watcher - Medium risk of patient condition declining or worsening    Shift Goals  Clinical Goals: ventilator compliance, SBP>90 and/or MAP>60  Patient Goals: NICK  Family Goals: none present    Progress made toward(s) clinical / shift goals:      Patient is not progressing towards the following goals:

## 2024-06-27 NOTE — CARE PLAN
Ventilator Daily Summary    Vent Day # 2  Airway: 7/22    Ventilator settings: CMV 14, vt 400, peep 8, Fio2 40%  Weaning trials: Planning for this am  Respiratory Procedures: None    Plan: Continue current ventilator settings and wean mechanical ventilation as tolerated per physician orders.      Problem: Ventilation  Goal: Ability to achieve and maintain unassisted ventilation or tolerate decreased levels of ventilator support  Description: Target End Date:  4 days     Document on Vent flowsheet    1.  Support and monitor invasive and noninvasive mechanical ventilation  2.  Monitor ventilator weaning response  3.  Perform ventilator associated pneumonia prevention interventions  4.  Manage ventilation therapy by monitoring diagnostic test results  Outcome: Progressing

## 2024-06-27 NOTE — CARE PLAN
The patient is Unstable - High likelihood or risk of patient condition declining or worsening    Shift Goals  Clinical Goals: wean off pressor, titrate FIO2 down, monitor labs,  Patient Goals: NICK  Family Goals: none present    Progress made toward(s) clinical / shift goals:    Problem: Safety - Medical Restraint  Goal: Remains free of injury from restraints (Restraint for Interference with Medical Device)  Outcome: Progressing     Problem: Skin Integrity  Goal: Skin integrity is maintained or improved  Outcome: Progressing  Note: Q2H turns and multiple mepilex in place     Problem: Fall Risk  Goal: Patient will remain free from falls  Outcome: Progressing     Problem: Mechanical Ventilation  Goal: Safe management of artificial airway and ventilation  Outcome: Progressing     Problem: Risk for Aspiration  Goal: Patient's risk for aspiration will be absent or decrease  Outcome: Progressing     Problem: Venous Thromboembolism (VTE) Prevention  Goal: The patient will remain free from venous thromboembolism (VTE)  Outcome: Progressing  Note: SCD's in place, Lovenox ordered     Problem: Urinary Elimination  Goal: Establish and maintain regular urinary output  Outcome: Progressing  Note: Urine output increasing       Patient is not progressing towards the following goals:      Problem: Knowledge Deficit - Standard  Goal: Patient and family/care givers will demonstrate understanding of plan of care, disease process/condition, diagnostic tests and medications  Outcome: Not Progressing  Note: Intubated and sedated     Problem: Safety - Medical Restraint  Goal: Free from restraint(s) (Restraint for Interference with Medical Device)  Outcome: Not Progressing     Problem: Hemodynamics  Goal: Patient's hemodynamics, fluid balance and neurologic status will be stable or improve  Outcome: Not Progressing  Note: Titrating Levo up     Problem: Nutrition  Goal: Patient's nutritional and fluid intake will be adequate or  improve  Outcome: Not Progressing  Flowsheets (Taken 6/27/2024 0145)  Oral Nutrition Supplement: NPO  Note: Severely malnourished

## 2024-06-27 NOTE — PROGRESS NOTES
Patient is not  to significant other in chart.  Patient however has been off sedation and wide awake.  Nods to wanting significant Christopher to be POA, but there is no paperwork stating this.  Patient consents to blood transfusion.  Dmitri Puentes RN was in room and witnessed conversation.

## 2024-06-27 NOTE — PROGRESS NOTES
1800 WBC of 0.6 relayed to Dr. Jackson notified of Hgb 7.7 and plt 65.  1820 Notified MD of electrolytes, replacements ordered.  1849 Updated  on POC.  1902 Notified MD of ANC 0.35

## 2024-06-27 NOTE — PROGRESS NOTES
Pulmonary & Critical Care Progress Note    Date of admission  6/26/2024    Chief Complaint  63 y.o. female admitted 6/26/2024 with respiratory failure    Hospital Course  63 y.o. female who presented 6/26/2024 with a history of metastatic laryngeal CA and recurrent pneumonia who presented to [facility] today brought in by EMS after being found down at home with an oxygen saturation of 36%.  Reportedly she was normal about 2 hours prior to that and was initially found by her .  She was admitted to [facility] 2 months ago with a RLL pneumonia and sent home on Augmentin. She has reportedly been on antibiotics for almost the entire two months since that admission.  Initially report was that she was alerted and somnolent but report from [facility] is that she had been completely unresponsive from discovery at home until [place] [facility]  We will obtain a stat CT head.  She is on only 30% FiO2 on arrival, intubated and saturating well on a small amount of levophed via her chest port.  She is actively undergoing chemotherapy and is neutropenic.     Interval Problem Update  Reviewed last 24 hour events:  ICU CHECKLIST:  Chart review from the past 24 hours includes imaging, laboratory studies, vital signs and notes available.  Pertinent system review for today's visit includes:  Significant overnight events: None    Neuro: intact  Cardiac: Stable  Pulmonary: Extubated this am to NC  Heme: blood counts dropping likely 2/2 chemo, Platelets 48, s/p 1 unit PRBC for 6.3   DVT Prophylaxis: held due to thrombocytopenia  /Renal: Stable   I/O: 1560/1325 = +235 = +235   Gilbert: immobility  ID:  On cefepime, MRSA nares negative, linezolid discontinued  Skin: Intact  GI/Nutrition: pending SLP eval   Prophylaxis: pepcid  Endo: Stable    Review of Systems  Review of Systems   Constitutional:  Negative for fever and malaise/fatigue.   HENT:  Positive for sore throat.    Respiratory:  Positive for cough  and sputum production. Negative for shortness of breath.    Cardiovascular:  Negative for chest pain.   Gastrointestinal:  Negative for abdominal pain.   Neurological:  Negative for dizziness and headaches.        Vital Signs for last 24 hours   Temp:  [36 °C (96.8 °F)-38.2 °C (100.8 °F)] 37 °C (98.6 °F)  Pulse:  [] 97  Resp:  [11-38] 20  BP: ()/(42-95) 157/84  SpO2:  [70 %-100 %] 94 %    Hemodynamic parameters for last 24 hours       Respiratory Information for the last 24 hours  Vent Mode: APVCMV  Rate (breaths/min): 14  Vt Target (mL): 400  PEEP/CPAP: 8  MAP: 11  Length of Weaning Trial (Hours): 2.5  Control VTE (exp VT): 421    Physical Exam   Physical Exam  Constitutional:       Appearance: Normal appearance.   HENT:      Head: Atraumatic.   Eyes:      Extraocular Movements: Extraocular movements intact.   Cardiovascular:      Rate and Rhythm: Normal rate and regular rhythm.   Pulmonary:      Effort: Pulmonary effort is normal. No respiratory distress.      Breath sounds: Normal breath sounds. No wheezing or rales.   Abdominal:      General: Abdomen is flat.   Musculoskeletal:         General: No swelling.   Skin:     General: Skin is dry.   Neurological:      General: No focal deficit present.      Mental Status: She is alert and oriented to person, place, and time.         Medications  Current Facility-Administered Medications   Medication Dose Route Frequency Provider Last Rate Last Admin    dextrose 10 % BOLUS 25 g  25 g Intravenous Q15 MIN PRN Kelsey George M.D.        enoxaparin (Lovenox) inj 40 mg  40 mg Subcutaneous DAILY AT 1800 Yani Jackson M.D.   40 mg at 06/26/24 1754    acetaminophen (Tylenol) tablet 650 mg  650 mg Enteral Tube Q6HRS PRN Yani Jackson M.D.        Respiratory Therapy Consult   Nebulization Continuous RT Yani Jackson M.D.        famotidine (Pepcid) tablet 20 mg  20 mg Enteral Tube Q12HRS Yani Jackson M.D.        Or    famotidine (Pepcid)  injection 20 mg  20 mg Intravenous Q12HRS Yani Jackson M.D.   20 mg at 06/27/24 0500    senna-docusate (Pericolace Or Senokot S) 8.6-50 MG per tablet 2 Tablet  2 Tablet Enteral Tube BID Yani Jackson M.D.        And    polyethylene glycol/lytes (Miralax) Packet 1 Packet  1 Packet Enteral Tube QDAY PRN Yani Jackson M.D.        And    magnesium hydroxide (Milk Of Magnesia) suspension 30 mL  30 mL Enteral Tube QDAY PRN Yani Jackson M.D.        And    bisacodyl (Dulcolax) suppository 10 mg  10 mg Rectal QDAY PRN Yani Jackson M.D. MD Alert...ICU Electrolyte Replacement per Pharmacy   Other PHARMACY TO DOSE Yani Jackson M.D.        lidocaine (Xylocaine) 1 % injection 2 mL  2 mL Tracheal Tube Q30 MIN PRN Yani Jackson M.D.        cefepime (Maxipime) 2 g in  mL IVPB  2 g Intravenous Q8HRS Yani Jackson M.D.   Stopped at 06/27/24 0534    vasopressin (Vasostrict) 20 Units in  mL Infusion  0.03 Units/min Intravenous Continuous Yani Jackson M.D.   Held at 06/26/24 1629    norepinephrine (Levophed) 8 mg in 250 mL NS infusion (premix)  0-1 mcg/kg/min Intravenous Continuous Yani Jackson M.D.   Stopped at 06/27/24 0950    dexmedetomidine (PRECEDEX) 400 mcg/100mL NS premix infusion  0.1-1.5 mcg/kg/hr Intravenous Continuous Yani Jackson M.D.   Paused at 06/27/24 0628    levothyroxine (Synthroid) tablet 50 mcg  50 mcg Oral AM ES Yani Jackson M.D.        methylPREDNISolone (SOLU-MEDROL) 40 MG injection 40 mg  40 mg Intravenous Q12HRS Yani Jackson M.D.   40 mg at 06/27/24 0501    fentaNYL (Sublimaze) injection 25 mcg  25 mcg Intravenous Q HOUR PRN Yani Jackson M.D.   25 mcg at 06/26/24 1807       Fluids    Intake/Output Summary (Last 24 hours) at 6/27/2024 1358  Last data filed at 6/27/2024 1200  Gross per 24 hour   Intake 1792.67 ml   Output 1760 ml   Net 32.67 ml       Laboratory  Recent Labs     06/26/24  1620 06/27/24  0443   ISTATAPH 7.410 7.524*    ISTATAPCO2 45.7* 31.0   ISTATAPO2 52* 71   ISTATATCO2 30 26   HJITDQC2EBJ 86* 96   ISTATARTHCO3 29.0* 25.5*   ISTATARTBE 4* 3   ISTATTEMP 98.5 F 38.1 C   ISTATFIO2 40 40   ISTATSPEC Arterial Arterial   ISTATAPHTC 7.411 7.507*   PCKHMOEZ8LL 52* 77         Recent Labs     06/26/24 1725 06/26/24 2230 06/27/24  0238   SODIUM 126* 124* 125*   POTASSIUM 2.9* 3.2* 3.4*   CHLORIDE 83* 84* 89*   CO2 28 20 26   BUN 17 17 16   CREATININE 0.54 0.50 0.50   MAGNESIUM 1.3* 1.6 2.3   PHOSPHORUS 3.0  --  1.9*   CALCIUM 8.1* 7.8* 7.7*     Recent Labs     06/26/24 1725 06/26/24 2230 06/27/24  0238   ALTSGPT  --   --  9   ASTSGOT  --   --  16   ALKPHOSPHAT  --   --  47   TBILIRUBIN  --   --  0.6   GLUCOSE 138* 82 91     Recent Labs     06/26/24 1725 06/27/24  0238   WBC 0.6*  0.6* 0.9*   NEUTSPOLYS 42.00*  --    LYMPHOCYTES 37.00  --    MONOCYTES 4.00  --    EOSINOPHILS 0.00  --    BASOPHILS 0.00  --    ASTSGOT  --  16   ALTSGPT  --  9   ALKPHOSPHAT  --  47   TBILIRUBIN  --  0.6     Recent Labs     06/26/24 1725 06/27/24  0238   RBC 2.26*  2.26* 1.83*   HEMOGLOBIN 7.7*  7.7* 6.3*   HEMATOCRIT 21.5*  21.5* 17.3*   PLATELETCT 65*  65* 49*       Imaging  X-Ray:  I have personally reviewed the images and compared with prior images.    Assessment/Plan  #Ventilator Dependent Respiratory Failure  #RLL pneumonia  Plan:  - Daily SAT/SBT per protocol - extubated this am  - Cover with linezolid and cefepime for HAP and Prior Enterobacter infection   - Solumedrol 40 BID  - HOB elevation  - Aspiration precautions  - Titrate oxygen to an SpO2 of 88-94%  - Airway clearance as needed     #Pancytopenia -   #Anemia (normocytic)  Plan:  - Monitor counts  - Broad spectrum coverage with cefepime  - ID consulted  - s/p 1 unit PRBC  - Follow up H/H  - Hold DVT prophylaxis     #Septic shock 2/2 pneumonia present on admission  Plan:  - Titrate Levophed to goal - off  - MAP goal > 65  - Fluid resuscitated with 2L NS at OSH  - Trend lactate  PRN - resolved     #Hyponatremia  Plan:  - Monitor on labs    #Hypophosphatemia  #Hypokalemia  #Hypomagnesemia  Plan:  - Monitor on labs  - Replete as needed    #Unresponsive on admission - recovered  Plan:  - Stat CT head with motion artifact  but essentially negative  - Patient woke up off sedation and responds, moves all extremities.      #History of Metastatic Laryngeal CA currently on chemotherapy  Plan:  - Monitor cell counts     #History of aspiration since radiation and resection for her cancer treatment  Plan:  - Aspiration precautions     #History of COPD  Plan:  - Solumedrol 40mg BID   - Duonebs as needed     #Hypothyroidism  Plan:  - Restarted home synthroid for tomorrow am     I have performed a physical exam and reviewed and updated ROS and Plan today (6/27/2024). In review of yesterday's note (6/26/2024), there are no changes except as documented above.     Discussed patient condition and risk of morbidity and/or mortality with RN, RT, Pharmacy   The patient remains critically ill.  Critical care time = 40 minutes in directly providing and coordinating critical care and extensive data review.  No time overlap and excludes procedures.    Yani Jackson MD RD  Pulmonary and Critical Care    Available on Voalte

## 2024-06-27 NOTE — WOUND TEAM
[facility]  Inpatient Services  Initial Wound and Skin Care Evaluation    Admission Date: 6/26/2024     Last order of IP CONSULT TO WOUND CARE was found on 6/26/2024 from Hospital Encounter on 6/26/2024     HPI, PMH, SH: Reviewed    No past surgical history on file.  Social History     Tobacco Use    Smoking status: Former     Types: Cigarettes     Start date: 4/1/2016    Smokeless tobacco: Not on file   Substance Use Topics    Alcohol use: Not on file     No chief complaint on file.    Diagnosis: Hypoxic respiratory failure (HCC) [J96.91]    Unit where seen by Wound Team: 3318/00     WOUND CONSULT RELATED TO:  sacrum    WOUND TEAM PLAN OF CARE - Frequency of Follow-up:   Nursing to follow dressing orders written for wound care. Contact wound team if area fails to progress, deteriorates or with any questions/concerns if something comes up before next scheduled follow up (See below as to whether wound is following and frequency of wound follow up)   Bi-Monthly -      WOUND HISTORY:   Pt is a 62 yo female who was transferred by [facility] after being found down and O2 of 36%.  Hx of laryngeal cancer.  Admitted for RF, septic shock        WOUND ASSESSMENT/LDA     Wound 04/22/24 Pressure Injury Sacrum unstag POA (Active)   Wound Image   06/26/24 1830   Site Assessment Light Purple 06/27/24 0800   Periwound Assessment Intact 06/27/24 0800   Margins Unattached edges 06/26/24 1830   Closure Other (Comment) 06/26/24 1830   Drainage Amount Scant 06/27/24 1500   Drainage Description Serous 06/27/24 1500   Dressing Status Clean;Dry;Intact 06/27/24 1200   Dressing Changed Changed 06/27/24 1500   Dressing Options Hydrocolloid Thin;Offloading Dressing - Sacral 06/27/24 1500   Dressing Change/Treatment Frequency Every 48 hrs, and As Needed 06/27/24 1500   NEXT Dressing Change/Treatment Date 06/29/24 06/27/24 1500   NEXT Weekly Photo (Inpatient Only) 07/04/24 06/27/24 1500   Wound Team Following Other (comment)  06/27/24 1500   Wound Length (cm) 1 cm 06/27/24 1500   Wound Width (cm) 0.5 cm 06/27/24 1500   Wound Surface Area (cm^2) 0.5 cm^2 06/27/24 1500   Wound Bed Slough (%) 100 % 06/27/24 1500   Wound Odor None 06/27/24 1500   WOUND NURSE ONLY - Time Spent with Patient (mins) 60 06/27/24 1500           Vascular:    GIANNA:   No results found.    Lab Values:    Lab Results   Component Value Date/Time    WBC 0.9 (LL) 06/27/2024 02:38 AM    RBC 1.83 (L) 06/27/2024 02:38 AM    HEMOGLOBIN 6.3 (L) 06/27/2024 02:38 AM    HEMATOCRIT 17.3 (LL) 06/27/2024 02:38 AM         Culture Results show:  No results found for this or any previous visit (from the past 720 hour(s)).    Pain Level/Medicated:  None, Tolerated without pain medication       INTERVENTIONS BY WOUND TEAM:  Performed standard wound care which includes appropriate positioning, dressing removal and non-selective debridement. Pictures and measurements obtained weekly if/when required.    Wound:  sacrococcygeal  Preparation for Dressing removal: Removed without difficulty  Cleansed/Non-selectively Debrided with:  Wound cleanser and Gauze  Rosa wound: Cleansed with Wound cleanser and Gauze, Prepped with No Sting  Primary Dressing:  hydrocolloid and off loading foam    Advanced Wound Care Discharge Planning  Number of Clinicians necessary to complete wound care: 1  Is patient requiring IV pain medications for dressing changes:  No   Length of time for dressing change 60 min. (This does not include chart review, pre-medication time, set up, clean up or time spent charting.)    Interdisciplinary consultation: Patient, Bedside RN (1), .      EVALUATION / RATIONALE FOR TREATMENT:     Date:  06/27/24  Wound Status:  Initial evaluation    Pt with small unstageable pressure injury due to immobility, low body weight.  Wound should resolve with wound care and off loading.           Goals: Steady decrease in wound area  weekly.    NURSING PLAN OF CARE ORDERS:  Dressing changes: See  Dressing Care orders    NUTRITION            PREVENTATIVE INTERVENTIONS:    Q shift Mauricio - performed per nursing policy  Q shift pressure point assessments - performed per nursing policy    Surface/Positioning  ICU Low Airloss - Currently in Place    Offloading/Redistribution  Sacral offloading dressing (Silicone dressing) - Currently in Place  Heel offloading dressing (Silicone dressing) - Currently in Place    Anticipated discharge plans:  TBD    pt will need ongoing wound care for sacral pressure injury.  Pt will need hospital bed and low air loss mattress if she goes home.      Vac Discharge Needs:  Vac Discharge plan is purely a recommendation from wound team and not a requirement for discharge unless otherwise stated by physician.  Not Applicable Pt not on a wound vac

## 2024-06-27 NOTE — CARE PLAN
The patient is Watcher - Medium risk of patient condition declining or worsening    Shift Goals  Clinical Goals: Wean off pressors, hopefully extubate today, administer prbc  Patient Goals: t[place]  Family Goals: [place]    Progress made toward(s) clinical / shift goals:  Will monitor patients vitals throughout shift. Monitor pain, encourage patient to mobilize today, rotate q2hrs to aid with pressure ulcer prevention.     Patient is not progressing towards the following goals:      Problem: Knowledge Deficit - Standard  Goal: Patient and family/care givers will demonstrate understanding of plan of care, disease process/condition, diagnostic tests and medications  Outcome: Progressing     Problem: Safety - Medical Restraint  Goal: Remains free of injury from restraints (Restraint for Interference with Medical Device)  Outcome: Met  Goal: Free from restraint(s) (Restraint for Interference with Medical Device)  Outcome: Met     Problem: Skin Integrity  Goal: Skin integrity is maintained or improved  Outcome: Progressing     Problem: Fall Risk  Goal: Patient will remain free from falls  Outcome: Met     Problem: Pain - Standard  Goal: Alleviation of pain or a reduction in pain to the patient’s comfort goal  Outcome: Met     Problem: Hemodynamics  Goal: Patient's hemodynamics, fluid balance and neurologic status will be stable or improve  Outcome: Met     Problem: Respiratory  Goal: Patient will achieve/maintain optimum respiratory ventilation and gas exchange  Outcome: Progressing     Problem: Urinary - Renal Perfusion  Goal: Ability to achieve and maintain adequate renal perfusion and functioning will improve  Outcome: Progressing

## 2024-06-27 NOTE — PROGRESS NOTES
12-hour chart check complete.    Monitor Summary  Rhythm: SR  Rate: 80'S-90'S  Ectopy: fpac'S  Measurements: 0.14/0.08/0.40

## 2024-06-27 NOTE — PROGRESS NOTES
Called patient's sig other for consent to give blood, however they are not legally . Notified day shift primary RN.

## 2024-06-27 NOTE — PROGRESS NOTES
Dr. Alarcon at bedside discussing plan with family and patient.  Explained that speech therapy will not be able to see her until tomorrow.     Patient sat at side of bed for approx 5 mins.  Patient c/o being weak, and overall not feeling well.  Patient denies nausea.

## 2024-06-27 NOTE — RESPIRATORY CARE
Extubation    Cuff leak noted yes  Stridor present no    O2 (LPM): 3 (06/27/24 8150)     Patient toleration yes  RCP Complete? yes  Events/Summary/Plan: Continue PEP / IS therapy QiD

## 2024-06-27 NOTE — RESPIRATORY CARE
Adult Ventilation Update    Events/Summary/Plan:  After  discussion with nurse, placed patient on spontaneous mode.  Tolerating well.    PIP 10 / PEEP 8 / 40%

## 2024-06-27 NOTE — PROGRESS NOTES
OVERNIGHT HOSPITALIST:    Paged by nursing Staff about this patient and came to the room to see her, she has been eating increasing amounts of norepinephrine and most recent lactic is 6.4.  Patient is intubated and sedated.  Norepinephrine currently at 0.16 mcg/kg/min.      Brief History: 63-year-old female, history of metastatic laryngeal cancer and recent admission for postobstructive pneumonia, came as a direct admission from [facility] for acute respiratory failure.  Intubated at outside facility.  Chest x-ray showing right lower lobe pneumonia, she was a started on vancomycin and Zosyn prior to transfer, transitioned to cefepime and linezolid now.  Stat CT of the head with motion artifact but essentially negative done for unresponsiveness on admission.  Patient wakes up off sedation.      Vitals: Temp:96.8       HR:79      RR:23      BP:82/52   Gen: Intubated and sedated  Lungs: No wheezing,  Heart:RRR    A/P:  #1:  Septic shock with worsening lactic acid, in spite of IV fluids  #2:  Ventilator dependent respiratory failure secondary to right lower lobe pneumonia.    -Added  mL bolus now.  -Repeat lactic every 4 hours  -Added a stat chest x-ray to evaluate if worsening pneumonia/infiltrate  -Prognosis is guarded.          The patient remains critically ill.  Critical care time =42  minutes in directly providing and coordinating critical care and extensive data review.  This includes time spent before the visit reviewing the chart, time spent evaluating the patient face-to-face  in the room, time discussing and updating Nursing Staff about plan and time spent after the visit reviewing results and on documentation. No time overlap and excludes procedures.

## 2024-06-27 NOTE — DIETARY
"Nutrition services: Day 1 of admit.  Meme Hurd is a 63 y.o. female with admitting DX of Hypoxic respiratory failure     Pt w/ BMI < 19. Metabolic cart consult (not ordered).      Assessment:  Height: 172.7 cm (5' 8\")  Weight: 45 kg (99 lb 3.3 oz)  Body mass index is 15.08 kg/m²., BMI classification: underweight  Diet/Intake: NPO    Evaluation:   History of metastatic laryngeal CA on chemo, recurrent pneumonia and aspiration. Pt extubated today.  Per MD's note, pt appears emaciated.   Labs: 6/27/24: sodium=125, potassium=3.4, phos=1.9, mag=2.3  Meds: Solu-medrol, Levophed (stopped), Precedex (paused)  Skin: wound on sacrum. WT consult pending  SLP consult pending due to concern for aspiration.    Pt reports wt loss of 10 lb (9%) x 2 weeks due to PO intake of <50% of normal.   Pt noted to have severe muscle loss in clavicle and shoulder region and severe fat loss in orbital and buccal region.    Malnutrition Risk: pt meets ASPEN criteria for severe malnutrition in the context of acute on chronic illness r/t decreased intake due to report of aspiration in conjunction with dx of laryngeal cancer AEB report of 9% wt loss x 2 weeks and PO < 50% of normal x 2 weeks, severe muscle and severe fat loss (see #7 above).    Recommendations/Plan:  Diet per SLP   Monitor weight.    RD will follow.    "

## 2024-06-28 PROBLEM — F41.9 ANXIETY IN CANCER PATIENT: Status: ACTIVE | Noted: 2024-06-28

## 2024-06-28 PROBLEM — G89.3 CANCER ASSOCIATED PAIN: Status: ACTIVE | Noted: 2024-01-01

## 2024-06-28 LAB
ALBUMIN SERPL BCP-MCNC: 3.1 G/DL (ref 3.2–4.9)
ALBUMIN/GLOB SERPL: 1 G/DL
ALP SERPL-CCNC: 58 U/L (ref 30–99)
ALT SERPL-CCNC: 15 U/L (ref 2–50)
ANION GAP SERPL CALC-SCNC: 12 MMOL/L (ref 7–16)
ANISOCYTOSIS BLD QL SMEAR: NORMAL
AST SERPL-CCNC: 26 U/L (ref 12–45)
BILIRUB SERPL-MCNC: 0.9 MG/DL (ref 0.1–1.5)
BUN SERPL-MCNC: 17 MG/DL (ref 8–22)
CALCIUM ALBUM COR SERPL-MCNC: 9.2 MG/DL (ref 8.5–10.5)
CALCIUM SERPL-MCNC: 8.5 MG/DL (ref 8.4–10.2)
CHLORIDE SERPL-SCNC: 86 MMOL/L (ref 96–112)
CO2 SERPL-SCNC: 28 MMOL/L (ref 20–33)
CREAT SERPL-MCNC: 0.44 MG/DL (ref 0.5–1.4)
ERYTHROCYTE [DISTWIDTH] IN BLOOD BY AUTOMATED COUNT: 51.8 FL (ref 35.9–50)
GFR SERPLBLD CREATININE-BSD FMLA CKD-EPI: 108 ML/MIN/1.73 M 2
GLOBULIN SER CALC-MCNC: 3.1 G/DL (ref 1.9–3.5)
GLUCOSE SERPL-MCNC: 80 MG/DL (ref 65–99)
HCT VFR BLD AUTO: 25.6 % (ref 37–47)
HGB BLD-MCNC: 9.1 G/DL (ref 12–16)
MACROCYTES BLD QL SMEAR: NORMAL
MAGNESIUM SERPL-MCNC: 1.8 MG/DL (ref 1.5–2.5)
MCH RBC QN AUTO: 32.7 PG (ref 27–33)
MCHC RBC AUTO-ENTMCNC: 35.5 G/DL (ref 32.2–35.5)
MCV RBC AUTO: 92.1 FL (ref 81.4–97.8)
OVALOCYTES BLD QL SMEAR: NORMAL
PHOSPHATE SERPL-MCNC: 2.8 MG/DL (ref 2.5–4.5)
PLATELET # BLD AUTO: 60 K/UL (ref 164–446)
PLATELET BLD QL SMEAR: NORMAL
PLATELETS.RETICULATED NFR BLD AUTO: 4.4 % (ref 0.6–13.1)
PMV BLD AUTO: 11.5 FL (ref 9–12.9)
POLYCHROMASIA BLD QL SMEAR: NORMAL
POTASSIUM SERPL-SCNC: 2.5 MMOL/L (ref 3.6–5.5)
POTASSIUM SERPL-SCNC: 3.2 MMOL/L (ref 3.6–5.5)
PROT SERPL-MCNC: 6.2 G/DL (ref 6–8.2)
RBC # BLD AUTO: 2.78 M/UL (ref 4.2–5.4)
RBC BLD AUTO: PRESENT
SODIUM SERPL-SCNC: 126 MMOL/L (ref 135–145)
WBC # BLD AUTO: 4.2 K/UL (ref 4.8–10.8)

## 2024-06-28 PROCEDURE — 700101 HCHG RX REV CODE 250

## 2024-06-28 PROCEDURE — 84100 ASSAY OF PHOSPHORUS: CPT

## 2024-06-28 PROCEDURE — 94760 N-INVAS EAR/PLS OXIMETRY 1: CPT

## 2024-06-28 PROCEDURE — 99498 ADVNCD CARE PLAN ADDL 30 MIN: CPT | Performed by: NURSE PRACTITIONER

## 2024-06-28 PROCEDURE — 99497 ADVNCD CARE PLAN 30 MIN: CPT | Performed by: NURSE PRACTITIONER

## 2024-06-28 PROCEDURE — 700105 HCHG RX REV CODE 258: Performed by: INTERNAL MEDICINE

## 2024-06-28 PROCEDURE — 92610 EVALUATE SWALLOWING FUNCTION: CPT

## 2024-06-28 PROCEDURE — 83735 ASSAY OF MAGNESIUM: CPT

## 2024-06-28 PROCEDURE — 700101 HCHG RX REV CODE 250: Performed by: HOSPITALIST

## 2024-06-28 PROCEDURE — 99255 IP/OBS CONSLTJ NEW/EST HI 80: CPT | Mod: 25 | Performed by: NURSE PRACTITIONER

## 2024-06-28 PROCEDURE — 700111 HCHG RX REV CODE 636 W/ 250 OVERRIDE (IP): Performed by: INTERNAL MEDICINE

## 2024-06-28 PROCEDURE — 84132 ASSAY OF SERUM POTASSIUM: CPT

## 2024-06-28 PROCEDURE — 85055 RETICULATED PLATELET ASSAY: CPT

## 2024-06-28 PROCEDURE — 94669 MECHANICAL CHEST WALL OSCILL: CPT

## 2024-06-28 PROCEDURE — 700101 HCHG RX REV CODE 250: Performed by: INTERNAL MEDICINE

## 2024-06-28 PROCEDURE — 99233 SBSQ HOSP IP/OBS HIGH 50: CPT | Performed by: INTERNAL MEDICINE

## 2024-06-28 PROCEDURE — 92612 ENDOSCOPY SWALLOW (FEES) VID: CPT

## 2024-06-28 PROCEDURE — 36415 COLL VENOUS BLD VENIPUNCTURE: CPT

## 2024-06-28 PROCEDURE — 770022 HCHG ROOM/CARE - ICU (200)

## 2024-06-28 PROCEDURE — C9113 INJ PANTOPRAZOLE SODIUM, VIA: HCPCS | Performed by: INTERNAL MEDICINE

## 2024-06-28 PROCEDURE — 85027 COMPLETE CBC AUTOMATED: CPT

## 2024-06-28 PROCEDURE — 80053 COMPREHEN METABOLIC PANEL: CPT

## 2024-06-28 RX ORDER — METHYLPREDNISOLONE SODIUM SUCCINATE 40 MG/ML
40 INJECTION, POWDER, LYOPHILIZED, FOR SOLUTION INTRAMUSCULAR; INTRAVENOUS DAILY
Status: DISCONTINUED | OUTPATIENT
Start: 2024-06-29 | End: 2024-06-29

## 2024-06-28 RX ORDER — BISACODYL 10 MG
10 SUPPOSITORY, RECTAL RECTAL
Status: DISCONTINUED | OUTPATIENT
Start: 2024-06-28 | End: 2024-06-29

## 2024-06-28 RX ORDER — METOPROLOL TARTRATE 1 MG/ML
INJECTION, SOLUTION INTRAVENOUS
Status: COMPLETED
Start: 2024-06-28 | End: 2024-06-28

## 2024-06-28 RX ORDER — POTASSIUM CHLORIDE 29.8 MG/ML
40 INJECTION INTRAVENOUS ONCE
Status: COMPLETED | OUTPATIENT
Start: 2024-06-28 | End: 2024-06-28

## 2024-06-28 RX ORDER — POLYETHYLENE GLYCOL 3350 17 G/17G
1 POWDER, FOR SOLUTION ORAL
Status: DISCONTINUED | OUTPATIENT
Start: 2024-06-28 | End: 2024-06-29

## 2024-06-28 RX ORDER — HYDROMORPHONE HYDROCHLORIDE 1 MG/ML
0.1 INJECTION, SOLUTION INTRAMUSCULAR; INTRAVENOUS; SUBCUTANEOUS
Status: DISCONTINUED | OUTPATIENT
Start: 2024-06-28 | End: 2024-06-30

## 2024-06-28 RX ORDER — METOPROLOL TARTRATE 1 MG/ML
5 INJECTION, SOLUTION INTRAVENOUS
Status: COMPLETED | OUTPATIENT
Start: 2024-06-28 | End: 2024-06-29

## 2024-06-28 RX ORDER — MAGNESIUM SULFATE HEPTAHYDRATE 40 MG/ML
2 INJECTION, SOLUTION INTRAVENOUS ONCE
Status: COMPLETED | OUTPATIENT
Start: 2024-06-28 | End: 2024-06-28

## 2024-06-28 RX ORDER — METOPROLOL TARTRATE 1 MG/ML
5 INJECTION, SOLUTION INTRAVENOUS
Status: DISCONTINUED | OUTPATIENT
Start: 2024-06-28 | End: 2024-06-28

## 2024-06-28 RX ORDER — AMOXICILLIN 250 MG
2 CAPSULE ORAL 2 TIMES DAILY
Status: DISCONTINUED | OUTPATIENT
Start: 2024-06-28 | End: 2024-06-29

## 2024-06-28 RX ORDER — METOPROLOL TARTRATE 1 MG/ML
5 INJECTION, SOLUTION INTRAVENOUS ONCE
Status: COMPLETED | OUTPATIENT
Start: 2024-06-28 | End: 2024-06-28

## 2024-06-28 RX ORDER — ACETAMINOPHEN 325 MG/1
650 TABLET ORAL EVERY 6 HOURS PRN
Status: DISCONTINUED | OUTPATIENT
Start: 2024-06-28 | End: 2024-06-30

## 2024-06-28 RX ORDER — POTASSIUM CHLORIDE 14.9 MG/ML
20 INJECTION INTRAVENOUS ONCE
Status: COMPLETED | OUTPATIENT
Start: 2024-06-28 | End: 2024-06-28

## 2024-06-28 RX ORDER — LORAZEPAM 2 MG/ML
0.5 INJECTION INTRAMUSCULAR EVERY 4 HOURS PRN
Status: DISCONTINUED | OUTPATIENT
Start: 2024-06-28 | End: 2024-06-30

## 2024-06-28 RX ORDER — SODIUM CHLORIDE, SODIUM LACTATE, POTASSIUM CHLORIDE, AND CALCIUM CHLORIDE .6; .31; .03; .02 G/100ML; G/100ML; G/100ML; G/100ML
500 INJECTION, SOLUTION INTRAVENOUS ONCE
Status: COMPLETED | OUTPATIENT
Start: 2024-06-28 | End: 2024-06-28

## 2024-06-28 RX ORDER — METOPROLOL TARTRATE 1 MG/ML
2.5 INJECTION, SOLUTION INTRAVENOUS ONCE
Status: COMPLETED | OUTPATIENT
Start: 2024-06-28 | End: 2024-06-28

## 2024-06-28 RX ADMIN — AMIODARONE HYDROCHLORIDE 150 MG: 1.5 INJECTION, SOLUTION INTRAVENOUS at 15:21

## 2024-06-28 RX ADMIN — LORAZEPAM 0.5 MG: 2 INJECTION INTRAMUSCULAR; INTRAVENOUS at 11:04

## 2024-06-28 RX ADMIN — HYDROMORPHONE HYDROCHLORIDE 0.1 MG: 1 INJECTION, SOLUTION INTRAMUSCULAR; INTRAVENOUS; SUBCUTANEOUS at 11:54

## 2024-06-28 RX ADMIN — HYDROMORPHONE HYDROCHLORIDE 0.1 MG: 1 INJECTION, SOLUTION INTRAMUSCULAR; INTRAVENOUS; SUBCUTANEOUS at 19:44

## 2024-06-28 RX ADMIN — PANTOPRAZOLE SODIUM 40 MG: 40 INJECTION, POWDER, FOR SOLUTION INTRAVENOUS at 06:33

## 2024-06-28 RX ADMIN — MAGNESIUM SULFATE HEPTAHYDRATE 2 G: 2 INJECTION, SOLUTION INTRAVENOUS at 08:13

## 2024-06-28 RX ADMIN — SODIUM CHLORIDE, POTASSIUM CHLORIDE, SODIUM LACTATE AND CALCIUM CHLORIDE 500 ML: 600; 310; 30; 20 INJECTION, SOLUTION INTRAVENOUS at 15:15

## 2024-06-28 RX ADMIN — METOPROLOL TARTRATE 2.5 MG: 1 INJECTION, SOLUTION INTRAVENOUS at 15:14

## 2024-06-28 RX ADMIN — POTASSIUM CHLORIDE 20 MEQ: 200 INJECTION, SOLUTION INTRAVENOUS at 10:17

## 2024-06-28 RX ADMIN — METOPROLOL TARTRATE 5 MG: 5 INJECTION INTRAVENOUS at 23:25

## 2024-06-28 RX ADMIN — CEFEPIME 2 G: 2 INJECTION, POWDER, FOR SOLUTION INTRAVENOUS at 06:33

## 2024-06-28 RX ADMIN — CEFEPIME 2 G: 2 INJECTION, POWDER, FOR SOLUTION INTRAVENOUS at 17:57

## 2024-06-28 RX ADMIN — METOPROLOL TARTRATE 5 MG: 5 INJECTION INTRAVENOUS at 21:12

## 2024-06-28 RX ADMIN — LORAZEPAM 0.5 MG: 2 INJECTION INTRAMUSCULAR; INTRAVENOUS at 23:30

## 2024-06-28 RX ADMIN — METOPROLOL TARTRATE 2.5 MG: 5 INJECTION INTRAVENOUS at 15:14

## 2024-06-28 RX ADMIN — METHYLPREDNISOLONE SODIUM SUCCINATE 40 MG: 40 INJECTION, POWDER, FOR SOLUTION INTRAMUSCULAR; INTRAVENOUS at 06:33

## 2024-06-28 RX ADMIN — HYDROMORPHONE HYDROCHLORIDE 0.1 MG: 1 INJECTION, SOLUTION INTRAMUSCULAR; INTRAVENOUS; SUBCUTANEOUS at 15:14

## 2024-06-28 RX ADMIN — AMIODARONE HYDROCHLORIDE 0.5 MG/MIN: 1.8 INJECTION, SOLUTION INTRAVENOUS at 21:09

## 2024-06-28 RX ADMIN — PANTOPRAZOLE SODIUM 40 MG: 40 INJECTION, POWDER, FOR SOLUTION INTRAVENOUS at 17:56

## 2024-06-28 RX ADMIN — AMIODARONE HYDROCHLORIDE 1 MG/MIN: 1.8 INJECTION, SOLUTION INTRAVENOUS at 15:35

## 2024-06-28 RX ADMIN — POTASSIUM CHLORIDE 40 MEQ: 29.8 INJECTION, SOLUTION INTRAVENOUS at 08:12

## 2024-06-28 ASSESSMENT — PAIN DESCRIPTION - PAIN TYPE
TYPE: ACUTE PAIN

## 2024-06-28 ASSESSMENT — ENCOUNTER SYMPTOMS
FEVER: 0
HEADACHES: 0
ABDOMINAL PAIN: 0
DIZZINESS: 0
COUGH: 1
SPUTUM PRODUCTION: 1
SHORTNESS OF BREATH: 1
SHORTNESS OF BREATH: 0
SORE THROAT: 1

## 2024-06-28 ASSESSMENT — COGNITIVE AND FUNCTIONAL STATUS - GENERAL
DRESSING REGULAR UPPER BODY CLOTHING: A LITTLE
MOVING TO AND FROM BED TO CHAIR: A LOT
STANDING UP FROM CHAIR USING ARMS: A LITTLE
TURNING FROM BACK TO SIDE WHILE IN FLAT BAD: A LOT
SUGGESTED CMS G CODE MODIFIER DAILY ACTIVITY: CK
WALKING IN HOSPITAL ROOM: A LITTLE
SUGGESTED CMS G CODE MODIFIER MOBILITY: CK
HELP NEEDED FOR BATHING: A LITTLE
DRESSING REGULAR LOWER BODY CLOTHING: A LOT
TOILETING: A LITTLE
MOBILITY SCORE: 15
DAILY ACTIVITIY SCORE: 19
MOVING FROM LYING ON BACK TO SITTING ON SIDE OF FLAT BED: A LITTLE
CLIMB 3 TO 5 STEPS WITH RAILING: A LOT

## 2024-06-28 ASSESSMENT — SOCIAL DETERMINANTS OF HEALTH (SDOH)
WITHIN THE LAST YEAR, HAVE TO BEEN RAPED OR FORCED TO HAVE ANY KIND OF SEXUAL ACTIVITY BY YOUR PARTNER OR EX-PARTNER?: NO
WITHIN THE LAST YEAR, HAVE YOU BEEN HUMILIATED OR EMOTIONALLY ABUSED IN OTHER WAYS BY YOUR PARTNER OR EX-PARTNER?: NO
WITHIN THE LAST YEAR, HAVE YOU BEEN KICKED, HIT, SLAPPED, OR OTHERWISE PHYSICALLY HURT BY YOUR PARTNER OR EX-PARTNER?: NO
WITHIN THE LAST YEAR, HAVE YOU BEEN AFRAID OF YOUR PARTNER OR EX-PARTNER?: NO

## 2024-06-28 NOTE — THERAPY
"Speech Language Pathology   Clinical Swallow Evaluation     Patient Name: Meme Hurd  AGE:  63 y.o., SEX:  female  Medical Record #: [id number]  Date of Service: 6/28/2024      History of Present Illness  Pt is a 62 y/o female admitted from [facility] on 6/26/24 after being found down by  at home, obtunded, with SpO2 in the 40's.   Pt intubated on arrival, 6/26-6/27.     PMHx: metastatic laryngeal cancer w/ hx of recent admission for post-obstructive pneumonia.     Head CT: negative.     CXR 6/28:   1.  Pulmonary infiltrates, similar to prior study.  2.  Cardiomegaly    No previous SLP notes found in EMR.     General Information:  Vitals  O2 (LPM): 1  O2 Delivery Device: Nasal Cannula  Level of Consciousness: Alert, Awake  Patient Behaviors: Fatigue, Anxious  Orientation: Oriented x 4  Follows Directives: Yes    Prior Living Situation & Level of Function:  Prior Services: Intermittent Physical Support for ADL Per Family  Lives with - Patient's Self Care Capacity: Spouse  Communication: WFL  Swallowing: Impaired; patient reports worsening dysphagia w/ 4 pneumonias since December. No recent SLP services, but significant weight loss.    Oral Mechanism Evaluation:  Dentition: Fair, Natural dentition   Facial Symmetry: Equal  Facial Sensation: Equal     Labial Observations: WFL   Lingual Observations: Midline  Motor Speech: WFL       Laryngeal Function:  Secretion Management: Adequate  Voice Quality: Hoarse  Max Phonation Time (seconds): 6  Cough: Perceptually weak     Subjective  RN cleared patient for CSE. Patient awake, alert, fatigued and anxious, but pleasant and cooperative. Patient reports worsening dysphagia w/ 4 pneumonias since December. She reports significant weight loss associated w/ same and states \"I'm not even eating at home. I can't.\" Patient reports her last radiation for laryngeal cancer was Sept 2022 and she has one more chemo round left. She reports no discussion of dysphagia, long-term " nutrition source, or SLP services for HNC and/or dysphagia exercises during entire duration of cancer treatment.    Assessment  Current Method of Nutrition: NPO until cleared by speech pathology  Positioning: Joe's (60-90 degrees)  Bolus Administration: SLP  O2 (LPM): 1 O2 Delivery Device: Nasal Cannula  Factor(s) Affecting Performance: Impaired endurance  Tracheostomy : No     Swallowing Trials:  Swallowing Trials  Ice: Impaired  Thin Liquid (TN0): Impaired  Mildly Thick Liquid (MT2): Impaired    Comments: Patient's oral motor evaluation was WNL with the exception of hoarse vocal quality and generalized weakness or oral motor musculature. Post-radiation atrophy and scars noted to neck area. Patient consumed PO trials of ice, thins via tsp, and MTL via tsp. Patient demonstrated appropriate bolus acceptance and adequate containment. On 100% of PO trials of all 3 consistencies, patient presented with consistent throat clearing, coughing, and multiple effortful swallows with grimace. When asked if it was painful to swallow, patient denied this, but reported grimace was d/t difficulty triggering the swallow. No further PO trials were given d/t concerns for aspiration.     Clinical Impressions  Patient presents with clinical indicators of oropharyngeal dysphagia, in the setting or laryngeal cancer s/p chemo and radiation, significant unintentional weight loss, report of 4 pneumonias since December, and worsening s/sx of aspiration resulting in very limited/poor PO intake at home atop recent intubation during this admit. Recommend patient continue NPO pending FEES today to further evaluate oropharyngeal swallow function and r/o aspiration. RN aware and patient in agreement.     Recommendations  Diet Consistency: NPO pending FEES today  Instrumentation: FEES  Medication: Non Oral  Oral Care: Q2h    SLP Treatment Plan  Treatment Plan: Dysphagia Treatment  SLP Frequency: 4x Per Week  Estimated Duration: Until Therapy  "Goals Met    Anticipated Discharge Needs  Discharge Recommendations: Recommend post-acute placement for additional speech therapy services prior to discharge home   Therapy Recommendations Upon DC: Dysphagia Training, Community Re-Integration, Patient / Family / Caregiver Education      Patient / Family Goals  Patient / Family Goal #1: \"I can't eat or drink anything\"  Short Term Goals  Short Term Goal # 1: Patient will participate in diagnostic swallow study to further evaluate oropharyngeal swallow function and r/o aspiration.    Fina Santiago, SLP   "

## 2024-06-28 NOTE — DISCHARGE PLANNING
Case Management Discharge Planning    Spoke with [employer] @ [facility] [phone]. Ta[employer]eria states their agency can't prescribe RX's or DME. They can assist with respite care, some medical supplies and EOL support. They would prefer pt d/c with [facility] however [facility]'t accept pt's insurance. Fax is [phone] if pt decides on hospice. Since they don't bill insurance pt can continue her treatment and follow up with Oncology. She requested POLST be completed if pt does discharge with hospice.

## 2024-06-28 NOTE — DISCHARGE PLANNING
Care Transition Team Assessment    Met with pt pt's partner, José Miguel and pt's son Julio Ling ([phone]) at bedside to complete dc planning assessment.   Pt lives in a single story with S/O José Miguel. Pt's son lives in [place]. Pt reports using a walker at baseline and uses 3L O2 through [employer]. José Miguel also recently bought pt a wheelchair due to increased weakness. Pt is mostly independent with self care and gets assistance from S/O as needed. S/O helps with transportation to MD appointments. Pt's PCP is Norma Antonio @ [facility] in [place].   Discussed hospice referral. Pt unsure if she wants hospice at this time. Pt states she has one last chemo scheduled on Monday that she is hoping to make. Briefly discussed hospice and notified pt of limited hospice options in their area. Only a volunteer agency- [facility] services their area. S/O reports being familiar with agency.   Palliative consult placed by MD to further discuss GOC.     Information Source  Orientation Level: (P) Oriented X4  Information Given By: Patient    Elopement Risk  Legal Hold: No  Ambulatory or Self Mobile in Wheelchair: No-Not an Elopement Risk  Elopement Risk: Not at Risk for Elopement    Interdisciplinary Discharge Planning  Lives with - Patient's Self Care Capacity: Spouse  Prior Services: Intermittent Physical Support for ADL Per Family    Discharge Preparedness  What is your plan after discharge?: Home with help  What are your discharge supports?: Child, Partner  Prior Functional Level: Independent with Activities of Daily Living, Independent with Medication Management  Difficulity with ADLs: Walking    Functional Assesment  Prior Functional Level: Independent with Activities of Daily Living, Independent with Medication Management    Finances  Financial Barriers to Discharge: No  Prescription Coverage: Yes    Domestic Abuse  Possible Abuse/Neglect Reported to:: Not Applicable    Psychological  Assessment  History of Substance Abuse: None  History of Psychiatric Problems: No  Non-compliant with Treatment: No  Newly Diagnosed Illness: Yes    Discharge Risks or Barriers  Discharge risks or barriers?: Post-acute placement / services    Anticipated Discharge Information  Discharge Disposition: Discharged to home/self care (01)

## 2024-06-28 NOTE — PROGRESS NOTES
Reg from Lab called with critical result of K: 2.5 at 0520. Critical lab result read back to Reg.   Dr. Strickland notified of critical lab result at 0523.  Critical lab result read back by Dr. Strickland.

## 2024-06-28 NOTE — PROGRESS NOTES
12-hour chart check complete.    Monitor Summary  Rhythm: SR/ST  Rate:   Ectopy: oPVC, rBigem/Trigem, rCoup  Measurements: .12/.08/.38

## 2024-06-28 NOTE — CARE PLAN
The patient is Watcher - Medium risk of patient condition declining or worsening    Shift Goals  Clinical Goals: SpO2 88-94%, Q2H turns, monitor SBP, and respiratory status  Patient Goals: Sleep tonight  Family Goals: Updates    Progress made toward(s) clinical / shift goals:    Problem: Respiratory  Goal: Patient will achieve/maintain optimum respiratory ventilation and gas exchange  Outcome: Progressing  Flowsheets (Taken 6/28/2024 0128)  O2 Delivery Device: Silicone Nasal Cannula  Note: Patient was extubated on 6/27 in the AM and has been weaned down to 1L NC. The patient is able to take deep breaths and cough, however the cough is moderate in strength. The patient appropriately self suctions when cough produces sputum.        Patient is not progressing towards the following goals:      Problem: Risk for Aspiration  Goal: Patient's risk for aspiration will be absent or decrease  Outcome: Not Progressing  Note: Patient remains NPO until seen by SPL     Problem: Nutrition  Goal: Patient's nutritional and fluid intake will be adequate or improve  Outcome: Not Progressing  Note: Patient remains NPO until seen by SPL

## 2024-06-28 NOTE — DOCUMENTATION QUERY
"                                                                         [facility]                                                                       Query Response Note      PATIENT:               VERITO LOZANO  ACCT #:                  [id number]  MRN:                     [id number]  :                      1960  ADMIT DATE:       2024 3:41 PM  DISCH DATE:          RESPONDING  PROVIDER #:        [id number]           QUERY TEXT:    In your clinical judgement please clarify the labs WBC 0.9, PLT 49, HGB 6.3 with a correlating diagnosis.    The patient's clinical indicators include:  63 year old female admitted for septic shock.     Renetta \"She is actively undergoing chemotherapy and is neutropenic.\"     WBC 0.6, PLT 65, HGB 7.7   WBC 0.9, PLT 49. HGB 6.3    Risk factors: septic shock, PNA, Metastatic Laryngeal CA, Chemo  Treatment: labs, ABX, Vent, CT, CXR    If you have any questions, please contact:  Nancy Perry RN [facility]  [email or web address]  Nancy Perry [address] [employer]  Options provided:   -- Pancytopenia due to chemotherapy   -- Drug induced pancytopenia      Query created by: Nancy Perry on 2024 12:32 PM    RESPONSE TEXT:    Pancytopenia due to chemotherapy       QUERY TEXT:    Patient admitted with Pneumonia. Documented in the medical record includes history of aspiration.    Can the diagnosis of pneumonia be further specified based on the above clinical indicators and required treatments?    The patient's Clinical Indicators include:  63 year old female admitted for septic shock.     Renetta \"History of aspiration since radiation and resection for her cancer treatment.\"  \"Aspiration precautions\"     Gregorio-Lista \"Chest x-ray  showing right lower lobe pneumonia, she was a started on vancomycin and Zosyn prior to transfer, transitioned to cefepime and linezolid now.\"      Risk factors: septic shock, PNA, Metastatic Laryngeal CA, Chemo  Treatment: " "labs, ABX, Vent, CT, CXR, aspiration precautions, IVF, pressors    If you have any questions, please contact:  Nancy Perry RN [facility]  [email or web address]  Nancy Perry [employer]  Options provided:   -- Gram Negative Pneumonia   -- Gram Positive Pneumonia   -- Aspiration Pneumonia      Query created by: Nancy Perry on 6/27/2024 12:37 PM    RESPONSE TEXT:    Aspiration Pneumonia       QUERY TEXT:    In your clinical judgement, please clarify the worsening lactic acid with a correlating diagnosis.    Note: If you agree with a diagnosis listed, please remember to include it in your concurrent daily documentation and onto the Discharge Summary.    The patient's clinical indicators include:  63 year old female admitted for septic shock.    6/26 Gregorio-Lista \"worsening lactic acid, in spite of IV fluids\"    6/26 Lactic acid 2.6 up to 6.4    Risk factors: septic shock, PNA, Metastatic Laryngeal CA, Chemo  Treatment: labs, ABX, Vent, CT, CXR, pressors, IVF,     If you have any questions, please contact:  Nancy Perry RN [facility]  [email or web address]  Nancy Perry [employer]  Options provided:   -- Lactic acidosis is a valid diagnosis this admission   -- Lactic acidosis is a valid diagnosis this admission and is now resolved      Query created by: Nancy Perry on 6/27/2024 12:42 PM    RESPONSE TEXT:    Lactic acidosis is a valid diagnosis this admission and is now resolved          Electronically signed by:  BACILIO PAYNE MD 6/28/2024 8:10 AM              "

## 2024-06-28 NOTE — THERAPY
Speech Language Pathology   Flexible Endoscopic Evaluation of Swallowing (FEES)        Patient Name: Meme Hurd  AGE:  63 y.o., SEX:  female  Medical Record #: [id number]  Date of Service: 6/28/2024      History of Present Illness  Pt is a 64 y/o female admitted from [facility] on 6/26/24 after being found down by  at home, obtunded, with SpO2 in the 40's.   Pt intubated on arrival, 6/26-6/27.      PMHx: metastatic laryngeal cancer w/ hx of recent admission for post-obstructive pneumonia.      Head CT: negative.      CXR 6/28:   1.  Pulmonary infiltrates, similar to prior study.  2.  Cardiomegaly     No previous SLP notes found in EMR.    Pertinent Information  Current Method of Nutrition: NPO until cleared by speech pathology  Patient Behaviors: Fatigue, Anxious   Dentition: Fair, Natural dentition   Feeding Tube: None   Tracheostomy: No   Factor(s) Affecting Performance: Impaired endurance     Discussed the risks, benefits, and alternatives of the FEES procedure. Patient/family acknowledged and agreed to proceed.    Assessment  Flexible Endoscopic Evaluation of Swallowing (FEES) completed at bedside today. The endoscope was passed transnasally via Left nare to evaluate the anatomy and physiology of swallowing. Pt tolerated the procedure with no apparent distress.    Anatomic Findings: WFL  Vocal Fold Motion: Bilateral movement  Secretion Management: Excess secretions, suctioning required  PO Trials: Ice Chips, Thin Liquid (tsp only), Mildly Thick Liquid (tsp only), Liquidised    Consistency PAS Score Timing Residue Comments   Thin Liquid 8 Post swallow Vallecular Residue: Moderate (25%-50%)  Pyriform Sinus Residue: Moderate (25%-50%) Tsp-PAS 7-8 on all trials   No cup or straw sips given    Mildly Thick 8 Post Swallow Vallecular Residue: Moderate (25%-50%)  Pyriform Sinus Residue: Moderate (25%-50%) Tsp-PAS 7-8 on all trials   No cup or straw sips given    Liquidised 8 Post Swallow Vallecular Residue:  Moderate (25%-50%)  Pyriform Sinus Residue: Moderate (25%-50%) PAS 7-8 on all trials      Penetration-Aspiration Scale (PAS)  1     No contrast enters airway  2     Contrast enters the airway, remains above the vocal folds, and is ejected from the airway (not seen in the airway at the end of the swallow).  3     Contrast enters the airway, remains above the vocal folds, and is not ejected from the airway (is seen in the airway after the swallow).  4     Contrast enters the airway, contacts the vocal folds, and is ejected from the airway.  5     Contrast enters the airway, contacts the vocal folds, and is not ejected from the airway  6     Contrast enters the airway, crosses the plane of the vocal folds, and is ejected from the airway.  7     Contrast enters the airway, crosses the plane of the vocal folds, and is not ejected from the airway despite effort.  8     Contrast enters the airway, crosses the plane of the vocal folds, is not ejected from the airway and there is no response to aspiration.    Oral phase:  Appropriate bolus acceptance and adequate containment. Suspect timely A-P bolus transit.     Pharyngeal phase:  Deficits characterized by: impaired LVC, impaired pharyngeal shortening, sluggish epiglottic inversion, reduced BoT retraction, and reduced laryngeal sensation.   These deficits resulted in the followin.) Inconsistent silent vs sensate (intermittent subtle throat clear) waterfall aspiration after the swallow on ALL trials; however, patient was unable to clear from airway despite cues.   2.) Moderate amount of vallecular and pyriform sinus (L>R) residue after the swallow that pooled and waterfalled into LV which then was subsequently aspirated as well.     Compensatory Strategies:  Multiple swallows: Did not reduce or clear residue   Cough/throat clear: Weak and ineffective in clearing aspirates or residue   Effortful swallow: Weak and ineffective in clearing aspirates     Severity  "Rating:  Severity Rating: JOSE  JOSE: Severe-profound     Clinical Impressions  The pt presents with a severe-profound oropharyngeal dysphagia, likely acute-on-chronic related to laryngeal cancer s/p radiation and chemo, debility, weight loss, and repeat pneumonias. Swallow safety is impaired; swallow efficiency is impaired. Pt appears to be at high risk for aspiration PNA, and high for malnutrition/dehydration. Non-oral nutrition is indicated. Swallow prognosis is poor given overall medical prognosis, hx of HNC w/ no previous SLP services and prolonged period of inadequate nutrition. Patient appears to be a guarded candidate for behavioral swallow rehabilitation.    Recommendations  Diet Consistency: NPO pending GOC (GI consult for PEG vs palliative consult for hospice/eating despite risks; MD aware); ok for 2-3 single ice chips per hour after oral care and when upright   Medication: Non Oral  Oral Care: Q2h  Additional Instrumentation: Repeat diagnostic study when clinically appropriate  Consult Referral(s): Gastroenterologist (if needed for PEG)     SLP Treatment Plan  Treatment Plan: Dysphagia Treatment  SLP Frequency: 4x Per Week  Estimated Duration: Until Therapy Goals Met    Anticipated Discharge Needs  Discharge Recommendations: Recommend post-acute placement for additional speech therapy services prior to discharge home   Therapy Recommendations Upon DC: Dysphagia Training, Community Re-Integration, Patient / Family / Caregiver Education     Patient / Family Goals  Patient / Family Goal #1: \"I can't eat or drink anything\"  Short Term Goal # 1: Patient will participate in diagnostic swallow study to further evaluate oropharyngeal swallow function and r/o aspiration.  Goal Outcome # 1: Goal met, new goal added  Short Term Goal # 1 B : NEW 6/28: Patient will consume prefeeding trials with SLP only with no overt s/sx of aspiration or decline in respiratory status.    Fina Santiago, SLP  "

## 2024-06-28 NOTE — DISCHARGE PLANNING
Referral Management Team    Received hospice referral. Patient lives in [place], [place].    Will defer discharge planning to hospital CM    RMT will no longer follow

## 2024-06-28 NOTE — PROGRESS NOTES
Pulmonary & Critical Care Progress Note    Date of admission  6/26/2024    Chief Complaint  63 y.o. female admitted 6/26/2024 with respiratory failure    Hospital Course  63 y.o. female who presented 6/26/2024 with a history of metastatic laryngeal CA and recurrent pneumonia who presented to [facility] today brought in by EMS after being found down at home with an oxygen saturation of 36%.  Reportedly she was normal about 2 hours prior to that and was initially found by her .  She was admitted to [facility] 2 months ago with a RLL pneumonia and sent home on Augmentin. She has reportedly been on antibiotics for almost the entire two months since that admission.  Initially report was that she was alerted and somnolent but report from [facility] is that she had been completely unresponsive from discovery at home until arrival here at [facility].  We will obtain a stat CT head.  She is on only 30% FiO2 on arrival, intubated and saturating well on a small amount of levophed via her chest port.  She is actively undergoing chemotherapy and is neutropenic.     Interval Problem Update  Reviewed last 24 hour events:  ICU CHECKLIST:  Chart review from the past 24 hours includes imaging, laboratory studies, vital signs and notes available.  Pertinent system review for today's visit includes:  Significant overnight events: None    Neuro: intact  Cardiac: Stable  Pulmonary: Stable in NC  Heme: WBC increased today, Platelets up slightly, Adequate response to 1 unit PRBC yesterday   DVT Prophylaxis: held due to thrombocytopenia  /Renal: Hypokalemia   I/O: 1560/1325 = +235 = +235   Gilbert: immobility  ID:  On cefepime, MRSA nares negative, linezolid discontinued. Respiratory culture with mixed walter.  Skin: Intact  GI/Nutrition: pending SLP eval   Prophylaxis: pepcid  Endo: Stable    Review of Systems  Review of Systems   Constitutional:  Negative for fever and malaise/fatigue.   HENT:  Positive for sore  throat.    Respiratory:  Positive for cough and sputum production. Negative for shortness of breath.    Cardiovascular:  Negative for chest pain.   Gastrointestinal:  Negative for abdominal pain.   Neurological:  Negative for dizziness and headaches.        Vital Signs for last 24 hours   Temp:  [37 °C (98.6 °F)-37.7 °C (99.9 °F)] 37.1 °C (98.8 °F)  Pulse:  [] 85  Resp:  [11-47] 28  BP: ()/() 141/82  SpO2:  [94 %-100 %] 94 %    Hemodynamic parameters for last 24 hours       Respiratory Information for the last 24 hours  Length of Weaning Trial (Hours): 2.5    Physical Exam   Physical Exam  Constitutional:       Comments: Emaciated    HENT:      Head: Atraumatic.   Eyes:      Extraocular Movements: Extraocular movements intact.   Cardiovascular:      Rate and Rhythm: Normal rate and regular rhythm.   Pulmonary:      Effort: Pulmonary effort is normal. No respiratory distress.      Breath sounds: Rhonchi present. No wheezing or rales.   Abdominal:      General: Abdomen is flat.   Musculoskeletal:         General: No swelling.   Skin:     General: Skin is dry.   Neurological:      General: No focal deficit present.      Mental Status: She is alert and oriented to person, place, and time.         Medications  Current Facility-Administered Medications   Medication Dose Route Frequency Provider Last Rate Last Admin    melatonin tablet 5 mg  5 mg Oral Nightly Yani Jackson M.D.        senna-docusate (Pericolace Or Senokot S) 8.6-50 MG per tablet 2 Tablet  2 Tablet Oral BID Yani Jcakson M.D.        And    polyethylene glycol/lytes (Miralax) Packet 1 Packet  1 Packet Oral QDAY PRN Yani Jackson M.D.        And    magnesium hydroxide (Milk Of Magnesia) suspension 30 mL  30 mL Oral QDAY PRN Yani Jackson M.D.        And    bisacodyl (Dulcolax) suppository 10 mg  10 mg Rectal QDAY PRN Yani Jackson M.D.        acetaminophen (Tylenol) tablet 650 mg  650 mg Oral Q6HRS PRN Yani Jackson  M.D.        magnesium sulfate IVPB premix 2 g  2 g Intravenous Once Yani Jackson M.D. 25 mL/hr at 06/28/24 0813 2 g at 06/28/24 0813    potassium chloride in water (Kcl) ivpb (ICU ONLY) 40 mEq  40 mEq Intravenous Once Yani Jackson M.D. 50 mL/hr at 06/28/24 0812 40 mEq at 06/28/24 0812    Followed by    potassium chloride in water (Kcl) ivpb **Administer in ICU only** 20 mEq  20 mEq Intravenous Once Yani Jackson M.D.        dextrose 10 % BOLUS 25 g  25 g Intravenous Q15 MIN PRN Kelsey George M.D.        pantoprazole (Protonix) injection 40 mg  40 mg Intravenous BID Yani Jackson M.D.   40 mg at 06/28/24 0633    Respiratory Therapy Consult   Nebulization Continuous RT Yani Jackson M.D. MD Alert...ICU Electrolyte Replacement per Pharmacy   Other PHARMACY TO DOSE Yani Jackson M.D.        lidocaine (Xylocaine) 1 % injection 2 mL  2 mL Tracheal Tube Q30 MIN PRN Yani Jackson M.D.        cefepime (Maxipime) 2 g in  mL IVPB  2 g Intravenous Q8HRS Yani Jackson M.D.   Stopped at 06/28/24 0703    norepinephrine (Levophed) 8 mg in 250 mL NS infusion (premix)  0-1 mcg/kg/min Intravenous Continuous Yani Jackson M.D.   Stopped at 06/27/24 0950    levothyroxine (Synthroid) tablet 50 mcg  50 mcg Oral AM ES Yani Jackson M.D.        methylPREDNISolone (SOLU-MEDROL) 40 MG injection 40 mg  40 mg Intravenous Q12HRS Yani Jackson M.D.   40 mg at 06/28/24 0633    fentaNYL (Sublimaze) injection 25 mcg  25 mcg Intravenous Q HOUR PRN Yani Jackson M.D.   25 mcg at 06/26/24 1807       Fluids    Intake/Output Summary (Last 24 hours) at 6/28/2024 0822  Last data filed at 6/28/2024 0600  Gross per 24 hour   Intake 232.5 ml   Output 1500 ml   Net -1267.5 ml       Laboratory  Recent Labs     06/26/24  1620 06/27/24  0443   ISTATAPH 7.410 7.524*   ISTATAPCO2 45.7* 31.0   ISTATAPO2 52* 71   ISTATATCO2 30 26   VRVCOHW5EBN 86* 96   ISTATARTHCO3 29.0* 25.5*   ISTATARTBE 4* 3    ISTATTEMP 98.5 F 38.1 C   ISTATFIO2 40 40   ISTATSPEC Arterial Arterial   ISTATAPHTC 7.411 7.507*   ULICWHAM8BE 52* 77         Recent Labs     06/26/24 1725 06/26/24 2230 06/27/24 0238 06/28/24  0455   SODIUM 126* 124* 125* 126*   POTASSIUM 2.9* 3.2* 3.4* 2.5*   CHLORIDE 83* 84* 89* 86*   CO2 28 20 26 28   BUN 17 17 16 17   CREATININE 0.54 0.50 0.50 0.44*   MAGNESIUM 1.3* 1.6 2.3 1.8   PHOSPHORUS 3.0  --  1.9* 2.8   CALCIUM 8.1* 7.8* 7.7* 8.5     Recent Labs     06/26/24 2230 06/27/24 0238 06/28/24  0455   ALTSGPT  --  9 15   ASTSGOT  --  16 26   ALKPHOSPHAT  --  47 58   TBILIRUBIN  --  0.6 0.9   GLUCOSE 82 91 80     Recent Labs     06/26/24 1725 06/27/24 0238 06/28/24 0420 06/28/24  0455   WBC 0.6*  0.6* 0.9* 4.2*  --    NEUTSPOLYS 42.00*  --   --   --    LYMPHOCYTES 37.00  --   --   --    MONOCYTES 4.00  --   --   --    EOSINOPHILS 0.00  --   --   --    BASOPHILS 0.00  --   --   --    ASTSGOT  --  16  --  26   ALTSGPT  --  9  --  15   ALKPHOSPHAT  --  47  --  58   TBILIRUBIN  --  0.6  --  0.9     Recent Labs     06/26/24 1725 06/27/24 0238 06/28/24  0420   RBC 2.26*  2.26* 1.83* 2.78*   HEMOGLOBIN 7.7*  7.7* 6.3* 9.1*   HEMATOCRIT 21.5*  21.5* 17.3* 25.6*   PLATELETCT 65*  65* 49* 60*       Imaging  X-Ray:  I have personally reviewed the images and compared with prior images.    Assessment/Plan  #RLL pneumonia  #Chronic Aspiration  Plan:  - Cover with cefepime for HAP and Prior Enterobacter infection x 7 days  - ID consulting  - Solumedrol 40 BID  - HOB elevation  - SLP for FEES today  - Aspiration precautions  - Titrate oxygen to an SpO2 of 88-94%  - Airway clearance as needed     #Pancytopenia -  - improving today  #Anemia (normocytic)  Plan:  - Monitor counts  - Broad spectrum coverage with cefepime  - ID consulted  - s/p 1 unit PRBC  - Hold DVT prophylaxis     #Septic shock 2/2 pneumonia present on admission - resolved  Plan:  - MAP goal > 65  - Fluid resuscitated with 2L NS at OSH  -  Trend lactate PRN - resolved     #Hyponatremia  Plan:  - Monitor on labs    #Hypophosphatemia  #Hypokalemia  #Hypomagnesemia  Plan:  - Monitor on labs  - Replete as needed    #Unresponsive on admission - recovered  Plan:  - Stat CT head with motion artifact  but essentially negative  - Patient woke up off sedation and responds, moves all extremities.      #History of Metastatic Laryngeal CA currently on chemotherapy  Plan:  - Monitor cell counts     #History of aspiration since radiation and resection for her cancer treatment  Plan:  - Aspiration precautions     #History of COPD  Plan:  - Solumedrol 40mg BID   - Duonebs as needed     #Hypothyroidism  Plan:  - Restarted home synthroid for tomorrow am     #Goals of Care   - I have discussed with Meme now several times the chronicity of her condition.  She is chronically aspirating and has had recurrent pneumonias without resolution for months.  She is constantly aspirating.  A feeding tube will not prevent this from recurring.  She is emaciated and severely malnourished.  She has palpable tumors despite chemotherapy and really should not be a candidate for further chemotherapy due to her active infection.  We discussed hospice.  She really wants to go home but lives in a resource poor area and I am significantly concerned, as is she, that she will die suffering without adequate anxiety or pain control.  We discussed starting ativan and pain medications here and discussing with Palliative Care.   - Consult to Palliative Care    I have performed a physical exam and reviewed and updated ROS and Plan today (6/28/2024). In review of yesterday's note (6/27/2024), there are no changes except as documented above.     Discussed patient condition and risk of morbidity and/or mortality with RN, RT, Pharmacy   The patient remains critically ill.  Critical care time = 60 minutes in directly providing and coordinating critical care and extensive data review.  No time overlap and  excludes procedures.    Yani Jackson MD RD  Pulmonary and Critical Care    Available on Voalte

## 2024-06-28 NOTE — CONSULTS
MRN: [id number]  Date of palliative consult: 24  Reason for consult: ACP  Referring provider: Dr. Jackson  Location of consult: 3961-33  Additional consulting services: Pulmonary    HPI:   Meme Hurd is a 63 y.o. female with medical history significant for metastatic laryngeal cancer currently on chemotherapy followed at [facility] in [place], recurrent pneumonia with recent admission to [facility] initially, COPD, former smoker, and hypothyroidism presented to [facility] in [facility] after her  found her unresponsive with home oxygen saturations of 36%; patient transferred 2024 for higher level of care.  Patient was intubated on arrival.  Workup notable for septic shock, pneumonia, and severe neutropenia.    ROS:    Review of Systems   HENT:          Metastatic cancer pain to left jaw and neck   Respiratory:  Positive for shortness of breath.    Gastrointestinal:         Unable to eat     PE:   Recent vital signs  BMI: Body mass index is 15.09 kg/m².    Temp (24hrs), Av.2 °C (99 °F), Min:37.1 °C (98.8 °F), Max:37.3 °C (99.1 °F)  Temperature: 37.1 °C (98.8 °F), Monitored Temp: 37.2 °C (99 °F)  Pulse  Av  Min: 68  Max: 110   Blood Pressure: (!) 170/84       Physical Exam  Constitutional:       Appearance: She is cachectic. She is ill-appearing.      Interventions: Nasal cannula in place.   HENT:      Head:      Jaw: Swelling and pain on movement present.      Mouth/Throat:      Mouth: Mucous membranes are dry.   Skin:     Coloration: Skin is pale.   Neurological:      General: No focal deficit present.      Mental Status: She is oriented to person, place, and time.      Comments: Oriented to event   Psychiatric:         Mood and Affect: Mood normal.         Behavior: Behavior normal.       ASSESSMENT/PLAN WITH SHARED DECISION MAKING:   PHYSICAL ASPECTS OF CARE  Palliative Performance Scale: 40%    # Metastatic laryngeal cancer status postsurgical resection,  "radiation therapy, currently undergoing chemotherapy  # Recurrent pneumonia with associated acute on chronic hypoxic respiratory failure  # Septic shock on admission - improving  # Neutropenia  # Multiple electrolyte derangements including hypokalemia and hyponatremia  # Severe protein calorie malnutrition with BMI 15.09 kg/m² With associated hypoalbuminemia #   # COPD  # Cancer associated pain & anxiety  Discussed transition to morphine oral concentrate and lorazepam oral concentrate sublingual/buccal in anticipation for discharge with Dr. Jackson  Monitor and treat constipation    Phone call to [facility] and left message for patient's primary care provider Norma Antonio who returns Tuesday 7/2 in regards to outpatient prescribing and plan of care.  Left palliative care office call back at my work cell phone number.    Received call back from Norma Antonio PCP (she is working at  today).  She confirms she is able to prescribe comfort meds for patient and any DME that might be needed to help with her care in the future.  She confirmed she has been working with a surgical consultation in their area to place enteral feeding tube to help with patient's nutrition and hydration status given she has laryngeal cancer and inability to swallow with recurrent aspiration pneumonia.     SOCIAL ASPECTS OF CARE  Discussed with social work Melissa Horton and appreciate all background information provided.  Patient resides in [place] with her significant other Norman.  Her son Julio lives in [place] [facility].    SPIRITUAL ASPECTS OF CARE   Follows her own [employer] \"[employer]    GOALS OF CARE/SERIOUS ILLNESS CONVERSATION  Introduced myself to patient, patient's significant other Christopher, and patient's son Julio along with BETO Crockett trainee. Discussed role of palliative care and reason for consult.  Patient agreeable to discuss goals of care.  I confirmed I have " reviewed her medical records and spoken with her primary care provider.      Patient discussed it is no longer her goal to continue receiving chemotherapy given that she has progression of disease.  She is spoken with multiple providers today including ICU physician and social work regarding hospice care.  I discussed resources in her area through [facility] and communicated that I confirmed her primary care provider Norma Cabrera from [facility] can prescribe her comfort focused medications and DME as needed.    Patient still wishes to explore enteral nutrition to help her live longer and have more energy.  She was previously very strong and feels her inability to eat is making her die quicker.  I discussed the risk benefit and general of artificial nutrition especially given her already significant malnutrition and neutropenia.  Discussed risk for infections, wounds, and poor healing that can be associated with high morbidity and mortality.  Confirmed ultimately insertion of artificial nutrition would be determined by surgery or GI.  Confirmed I will discuss with ICU physician.    Discussed CODE STATUS.  Provided education regarding resuscitation efforts and poor survival rates of those with cancer.  Inquired if she suffered respiratory failure/arrest again if she would want to be intubated.  She confirms she would not want to be reintubated again.  Discussed cardiac resuscitation.  Patient confirms she would not want chest compressions or cardiac resuscitation.  Recommended DNR/DNI and patient agreeable.    Reviewed POLST form and completed for DNR/allow natural death, comfort focused treatment upon discharge from hospital, okay with long-term artificial nutrition, no IV fluids given this would require return to the hospital.  Education provided to place POLST form on refrigerator once home for EMS to see.  Provided additional copy to patient and obtained copy to scanned into  "electronic medical record.     Patient is okay with continuing ICU level of care in hopes that she can improve enough to explore artificial nutrition options and return home with comfort focused care plan and volunteer hospice.    She would like to appoint her significant other Norman Gee of 20 years as her healthcare agent.  Offered to assist with [place] advanced directive.  Patient would like to defer and complete [place] advance directive and would like to explore options for completing a will.  Will have palliative social work follow-up Monday if patient remains admitted.  Patient family denied additional questions or needs.. Provided palliative care contact information and encouraged patient/family to reach out with any questions/needs.     Code Status: Full; changed to DNR/DNI    ACP Documents: None on file.  POLST form completed and pending scanning into EMR.  AD follow-up Monday for CA AD from [facility].    76 minutes spent discussing advance care planning, this time excludes any other billed services.    Interval diagnostic studies and medical documentation entries pertinent to this case were reviewed independently by me. This patient has at least one acute or chronic illness or injury that poses a threat to life or bodily function. This patient suffers from a high risk of morbidity from additional invasive diagnostic testing or intensive treatment. Discussion of recommendations and coordination of care undertaken with primary provider/treatment team.      Nancy \"Crissy\" BETO Caballero, [facility]  Inpatient Palliative Care (service hours Mon-Fri 8AM - 5PM)  [phone]        "

## 2024-06-29 LAB
ALBUMIN SERPL BCP-MCNC: 3 G/DL (ref 3.2–4.9)
ALBUMIN/GLOB SERPL: 1.1 G/DL
ALP SERPL-CCNC: 60 U/L (ref 30–99)
ALT SERPL-CCNC: 16 U/L (ref 2–50)
ANION GAP SERPL CALC-SCNC: 11 MMOL/L (ref 7–16)
AST SERPL-CCNC: 29 U/L (ref 12–45)
BILIRUB SERPL-MCNC: 0.5 MG/DL (ref 0.1–1.5)
BUN SERPL-MCNC: 24 MG/DL (ref 8–22)
CALCIUM ALBUM COR SERPL-MCNC: 9.3 MG/DL (ref 8.5–10.5)
CALCIUM SERPL-MCNC: 8.5 MG/DL (ref 8.4–10.2)
CHLORIDE SERPL-SCNC: 88 MMOL/L (ref 96–112)
CO2 SERPL-SCNC: 27 MMOL/L (ref 20–33)
CREAT SERPL-MCNC: 0.52 MG/DL (ref 0.5–1.4)
EKG IMPRESSION: NORMAL
ERYTHROCYTE [DISTWIDTH] IN BLOOD BY AUTOMATED COUNT: 55 FL (ref 35.9–50)
GFR SERPLBLD CREATININE-BSD FMLA CKD-EPI: 103 ML/MIN/1.73 M 2
GLOBULIN SER CALC-MCNC: 2.7 G/DL (ref 1.9–3.5)
GLUCOSE SERPL-MCNC: 106 MG/DL (ref 65–99)
HCT VFR BLD AUTO: 28.7 % (ref 37–47)
HGB BLD-MCNC: 10 G/DL (ref 12–16)
INR PPP: 1.15 (ref 0.87–1.13)
LACTATE SERPL-SCNC: 0.7 MMOL/L (ref 0.5–2)
LACTATE SERPL-SCNC: 0.9 MMOL/L (ref 0.5–2)
MAGNESIUM SERPL-MCNC: 1.8 MG/DL (ref 1.5–2.5)
MCH RBC QN AUTO: 32.7 PG (ref 27–33)
MCHC RBC AUTO-ENTMCNC: 34.8 G/DL (ref 32.2–35.5)
MCV RBC AUTO: 93.8 FL (ref 81.4–97.8)
PHOSPHATE SERPL-MCNC: 2.9 MG/DL (ref 2.5–4.5)
PLATELET # BLD AUTO: 89 K/UL (ref 164–446)
PLATELETS.RETICULATED NFR BLD AUTO: 4.4 % (ref 0.6–13.1)
PMV BLD AUTO: 9.6 FL (ref 9–12.9)
POTASSIUM SERPL-SCNC: 3.4 MMOL/L (ref 3.6–5.5)
PROT SERPL-MCNC: 5.7 G/DL (ref 6–8.2)
PROTHROMBIN TIME: 15.2 SEC (ref 12–14.6)
RBC # BLD AUTO: 3.06 M/UL (ref 4.2–5.4)
SODIUM SERPL-SCNC: 126 MMOL/L (ref 135–145)
WBC # BLD AUTO: 11.1 K/UL (ref 4.8–10.8)

## 2024-06-29 PROCEDURE — 700111 HCHG RX REV CODE 636 W/ 250 OVERRIDE (IP): Performed by: HOSPITALIST

## 2024-06-29 PROCEDURE — 85055 RETICULATED PLATELET ASSAY: CPT

## 2024-06-29 PROCEDURE — 85610 PROTHROMBIN TIME: CPT

## 2024-06-29 PROCEDURE — 83605 ASSAY OF LACTIC ACID: CPT

## 2024-06-29 PROCEDURE — 770001 HCHG ROOM/CARE - MED/SURG/GYN PRIV*

## 2024-06-29 PROCEDURE — 99291 CRITICAL CARE FIRST HOUR: CPT | Performed by: HOSPITALIST

## 2024-06-29 PROCEDURE — 700105 HCHG RX REV CODE 258: Performed by: INTERNAL MEDICINE

## 2024-06-29 PROCEDURE — C9113 INJ PANTOPRAZOLE SODIUM, VIA: HCPCS | Performed by: INTERNAL MEDICINE

## 2024-06-29 PROCEDURE — 83735 ASSAY OF MAGNESIUM: CPT

## 2024-06-29 PROCEDURE — 94760 N-INVAS EAR/PLS OXIMETRY 1: CPT

## 2024-06-29 PROCEDURE — 93005 ELECTROCARDIOGRAM TRACING: CPT | Performed by: HOSPITALIST

## 2024-06-29 PROCEDURE — 700101 HCHG RX REV CODE 250: Performed by: HOSPITALIST

## 2024-06-29 PROCEDURE — 700111 HCHG RX REV CODE 636 W/ 250 OVERRIDE (IP): Mod: JZ | Performed by: INTERNAL MEDICINE

## 2024-06-29 PROCEDURE — 84100 ASSAY OF PHOSPHORUS: CPT

## 2024-06-29 PROCEDURE — 93010 ELECTROCARDIOGRAM REPORT: CPT | Performed by: INTERNAL MEDICINE

## 2024-06-29 PROCEDURE — 85027 COMPLETE CBC AUTOMATED: CPT

## 2024-06-29 PROCEDURE — 80053 COMPREHEN METABOLIC PANEL: CPT

## 2024-06-29 RX ORDER — SCOLOPAMINE TRANSDERMAL SYSTEM 1 MG/1
1 PATCH, EXTENDED RELEASE TRANSDERMAL
Status: DISCONTINUED | OUTPATIENT
Start: 2024-06-29 | End: 2024-06-29

## 2024-06-29 RX ORDER — DEXTROSE MONOHYDRATE 50 MG/ML
INJECTION, SOLUTION INTRAVENOUS CONTINUOUS
Status: DISCONTINUED | OUTPATIENT
Start: 2024-06-29 | End: 2024-06-29

## 2024-06-29 RX ORDER — ATROPINE SULFATE 10 MG/ML
2 SOLUTION/ DROPS OPHTHALMIC EVERY 4 HOURS PRN
Status: DISCONTINUED | OUTPATIENT
Start: 2024-06-29 | End: 2024-06-30

## 2024-06-29 RX ORDER — POTASSIUM CHLORIDE 29.8 MG/ML
40 INJECTION INTRAVENOUS ONCE
Status: DISCONTINUED | OUTPATIENT
Start: 2024-06-29 | End: 2024-06-29

## 2024-06-29 RX ORDER — MAGNESIUM SULFATE HEPTAHYDRATE 40 MG/ML
2 INJECTION, SOLUTION INTRAVENOUS ONCE
Status: COMPLETED | OUTPATIENT
Start: 2024-06-29 | End: 2024-06-29

## 2024-06-29 RX ORDER — LORAZEPAM 2 MG/ML
1 INJECTION INTRAMUSCULAR ONCE
Status: COMPLETED | OUTPATIENT
Start: 2024-06-29 | End: 2024-06-29

## 2024-06-29 RX ADMIN — CEFEPIME 2 G: 2 INJECTION, POWDER, FOR SOLUTION INTRAVENOUS at 05:44

## 2024-06-29 RX ADMIN — POTASSIUM CHLORIDE 40 MEQ: 29.8 INJECTION, SOLUTION INTRAVENOUS at 07:47

## 2024-06-29 RX ADMIN — MAGNESIUM SULFATE HEPTAHYDRATE 2 G: 2 INJECTION, SOLUTION INTRAVENOUS at 07:31

## 2024-06-29 RX ADMIN — HYDROMORPHONE HYDROCHLORIDE 0.1 MG: 1 INJECTION, SOLUTION INTRAMUSCULAR; INTRAVENOUS; SUBCUTANEOUS at 13:02

## 2024-06-29 RX ADMIN — METOPROLOL TARTRATE 5 MG: 5 INJECTION INTRAVENOUS at 04:46

## 2024-06-29 RX ADMIN — DEXTROSE MONOHYDRATE: 50 INJECTION, SOLUTION INTRAVENOUS at 07:41

## 2024-06-29 RX ADMIN — LORAZEPAM 0.5 MG: 2 INJECTION INTRAMUSCULAR; INTRAVENOUS at 13:02

## 2024-06-29 RX ADMIN — PANTOPRAZOLE SODIUM 40 MG: 40 INJECTION, POWDER, FOR SOLUTION INTRAVENOUS at 05:44

## 2024-06-29 RX ADMIN — LORAZEPAM 1 MG: 2 INJECTION INTRAMUSCULAR; INTRAVENOUS at 03:09

## 2024-06-29 RX ADMIN — AMIODARONE HYDROCHLORIDE 0.5 MG/MIN: 1.8 INJECTION, SOLUTION INTRAVENOUS at 07:49

## 2024-06-29 RX ADMIN — METHYLPREDNISOLONE SODIUM SUCCINATE 40 MG: 40 INJECTION, POWDER, FOR SOLUTION INTRAMUSCULAR; INTRAVENOUS at 05:44

## 2024-06-29 RX ADMIN — METOPROLOL TARTRATE 5 MG: 5 INJECTION INTRAVENOUS at 03:16

## 2024-06-29 SDOH — ECONOMIC STABILITY: TRANSPORTATION INSECURITY
IN THE PAST 12 MONTHS, HAS LACK OF RELIABLE TRANSPORTATION KEPT YOU FROM MEDICAL APPOINTMENTS, MEETINGS, WORK OR FROM GETTING THINGS NEEDED FOR DAILY LIVING?: NO

## 2024-06-29 SDOH — ECONOMIC STABILITY: TRANSPORTATION INSECURITY
IN THE PAST 12 MONTHS, HAS THE LACK OF TRANSPORTATION KEPT YOU FROM MEDICAL APPOINTMENTS OR FROM GETTING MEDICATIONS?: NO

## 2024-06-29 ASSESSMENT — PAIN DESCRIPTION - PAIN TYPE
TYPE: ACUTE PAIN

## 2024-06-29 ASSESSMENT — LIFESTYLE VARIABLES
HOW MANY TIMES IN THE PAST YEAR HAVE YOU HAD 5 OR MORE DRINKS IN A DAY: 0
HAVE PEOPLE ANNOYED YOU BY CRITICIZING YOUR DRINKING: NO
CONSUMPTION TOTAL: NEGATIVE
EVER HAD A DRINK FIRST THING IN THE MORNING TO STEADY YOUR NERVES TO GET RID OF A HANGOVER: NO
HAVE YOU EVER FELT YOU SHOULD CUT DOWN ON YOUR DRINKING: NO
TOTAL SCORE: 0
EVER FELT BAD OR GUILTY ABOUT YOUR DRINKING: NO
ON A TYPICAL DAY WHEN YOU DRINK ALCOHOL HOW MANY DRINKS DO YOU HAVE: 0
TOTAL SCORE: 0
AVERAGE NUMBER OF DAYS PER WEEK YOU HAVE A DRINK CONTAINING ALCOHOL: 0
TOTAL SCORE: 0
ALCOHOL_USE: NO

## 2024-06-29 ASSESSMENT — PATIENT HEALTH QUESTIONNAIRE - PHQ9
2. FEELING DOWN, DEPRESSED, IRRITABLE, OR HOPELESS: NOT AT ALL
SUM OF ALL RESPONSES TO PHQ9 QUESTIONS 1 AND 2: 0
1. LITTLE INTEREST OR PLEASURE IN DOING THINGS: NOT AT ALL

## 2024-06-29 ASSESSMENT — SOCIAL DETERMINANTS OF HEALTH (SDOH)
IN THE PAST 12 MONTHS, HAS THE ELECTRIC, GAS, OIL, OR WATER COMPANY THREATENED TO SHUT OFF SERVICE IN YOUR HOME?: NO
WITHIN THE PAST 12 MONTHS, THE FOOD YOU BOUGHT JUST DIDN'T LAST AND YOU DIDN'T HAVE MONEY TO GET MORE: NEVER TRUE
WITHIN THE PAST 12 MONTHS, YOU WORRIED THAT YOUR FOOD WOULD RUN OUT BEFORE YOU GOT THE MONEY TO BUY MORE: NEVER TRUE

## 2024-06-29 ASSESSMENT — FIBROSIS 4 INDEX: FIB4 SCORE: 5.21

## 2024-06-29 NOTE — CARE PLAN
The patient is Watcher - Medium risk of patient condition declining or worsening    Shift Goals  Clinical Goals: pain control, amio gtt, HR<120  Patient Goals: comfort, rest  Family Goals: NICK    Progress made toward(s) clinical / shift goals:      Problem: Knowledge Deficit - Standard  Goal: Patient and family/care givers will demonstrate understanding of plan of care, disease process/condition, diagnostic tests and medications  Outcome: Progressing     Problem: Skin Integrity  Goal: Skin integrity is maintained or improved  Outcome: Progressing     Problem: Psychosocial  Goal: Patient's level of anxiety will decrease  Outcome: Progressing     Problem: Respiratory  Goal: Patient will achieve/maintain optimum respiratory ventilation and gas exchange  Outcome: Progressing       Patient is not progressing towards the following goals:

## 2024-06-29 NOTE — DIETARY
Nutrition Services:  RD had been following for the possibility of nutrition support.  Per nursing, patient may be headed towards comfort care status.  RD available PRN.

## 2024-06-29 NOTE — PROGRESS NOTES
Pulmonary & Critical Care Progress Note    Date of admission  6/26/2024    Chief Complaint  63 y.o. female admitted 6/26/2024 with respiratory failure    Hospital Course  63 y.o. female who presented 6/26/2024 with a history of metastatic laryngeal CA and recurrent pneumonia who presented to [facility] today brought in by EMS after being found down at home with an oxygen saturation of 36%.  Reportedly she was normal about 2 hours prior to that and was initially found by her .  She was admitted to [facility] 2 months ago with a RLL pneumonia and sent home on Augmentin. She has reportedly been on antibiotics for almost the entire two months since that admission.  Initially report was that she was alerted and somnolent but report from [facility] is that she had been completely unresponsive from discovery at home until arrival here at [facility].  We will obtain a stat CT head.  She is on only 30% FiO2 on arrival, intubated and saturating well on a small amount of levophed via her chest port.  She is actively undergoing chemotherapy and is neutropenic.     Interval Problem Update  Reviewed last 24 hour events:  ICU CHECKLIST:  Chart review from the past 24 hours includes imaging, laboratory studies, vital signs and notes available.  Pertinent system review for today's visit includes:  Significant overnight events: Patient with left arm weakness and new onset facial droop overnight    Neuro: intact  Cardiac: Stable  Pulmonary: Stable in NC  Heme: WBC increased today, Platelets up slightly, Adequate response to 1 unit PRBC yesterday   DVT Prophylaxis: held due to thrombocytopenia  /Renal: Hypokalemia   I/O: 1560/1325 = +235 = +235   Gilbert: immobility  ID:  On cefepime, MRSA nares negative, linezolid discontinued. Respiratory culture with mixed walter.  Skin: Intact  GI/Nutrition: pending SLP eval   Prophylaxis: pepcid  Endo: Stable    Review of Systems  Review of Systems   Unable to perform ROS:  Patient unresponsive        Vital Signs for last 24 hours   Temp:  [36.2 °C (97.1 °F)-37 °C (98.6 °F)] 36.2 °C (97.2 °F)  Pulse:  [] 95  Resp:  [13-42] 27  BP: ()/() 116/67  SpO2:  [86 %-99 %] 99 %    Hemodynamic parameters for last 24 hours       Respiratory Information for the last 24 hours       Physical Exam   Physical Exam  Constitutional:       General: She is not in acute distress.     Appearance: She is ill-appearing.      Comments: Emaciated    HENT:      Head: Atraumatic.   Cardiovascular:      Rate and Rhythm: Tachycardia present. Rhythm irregular.   Pulmonary:      Effort: Pulmonary effort is normal. No respiratory distress.      Breath sounds: Rhonchi present. No wheezing or rales.   Abdominal:      General: Abdomen is flat.   Musculoskeletal:         General: No swelling.   Skin:     General: Skin is dry.   Neurological:      Comments: Unresponsive to stimuli         Medications  Current Facility-Administered Medications   Medication Dose Route Frequency Provider Last Rate Last Admin    potassium chloride in water (Kcl) ivpb (ICU ONLY) 40 mEq  40 mEq Intravenous Once Yani Jackson M.D. 25 mL/hr at 06/29/24 0747 40 mEq at 06/29/24 0747    magnesium sulfate IVPB premix 2 g  2 g Intravenous Once Yani Jackson M.D.   Stopped at 06/29/24 0931    dextrose 5% infusion   Intravenous Continuous Yani Jackson M.D. 30 mL/hr at 06/29/24 0741 New Bag at 06/29/24 0741    senna-docusate (Pericolace Or Senokot S) 8.6-50 MG per tablet 2 Tablet  2 Tablet Oral BID Yani Jackson M.D.        And    polyethylene glycol/lytes (Miralax) Packet 1 Packet  1 Packet Oral QDAY PRN Yani Jackson M.D.        And    magnesium hydroxide (Milk Of Magnesia) suspension 30 mL  30 mL Oral QDAY PRN Yani Jackson M.D.        And    bisacodyl (Dulcolax) suppository 10 mg  10 mg Rectal QDAY PRN Yani Jackson M.D.        acetaminophen (Tylenol) tablet 650 mg  650 mg Oral Q6HRS PRN Yani Jackson,  M.D.        HYDROmorphone (Dilaudid) injection 0.1 mg  0.1 mg Intravenous Q3HRS PRN Yani Jackson M.D.   0.1 mg at 06/28/24 1944    cefepime (Maxipime) 2 g in  mL IVPB  2 g Intravenous Q12HRS Yani Jackson M.D.   Stopped at 06/29/24 0614    LORazepam (Ativan) injection 0.5 mg  0.5 mg Intravenous Q4HRS PRN Yani Jackson M.D.   0.5 mg at 06/28/24 2330    methylPREDNISolone (SOLU-MEDROL) 40 MG injection 40 mg  40 mg Intravenous DAILY Yani Jackson M.D.   40 mg at 06/29/24 0544    amiodarone (Nexterone) 360 mg/200 mL infusion  0.5 mg/min Intravenous Continuous Yani Jackson M.D. 16.7 mL/hr at 06/29/24 0749 0.5 mg/min at 06/29/24 0749    dextrose 10 % BOLUS 25 g  25 g Intravenous Q15 MIN PRN Kelsey George M.D.        pantoprazole (Protonix) injection 40 mg  40 mg Intravenous BID Yani Jackson M.D.   40 mg at 06/29/24 0544    Respiratory Therapy Consult   Nebulization Continuous RT Yani Jackson M.D. MD Alert...ICU Electrolyte Replacement per Pharmacy   Other PHARMACY TO DOSE Yani Jackson M.D.        lidocaine (Xylocaine) 1 % injection 2 mL  2 mL Tracheal Tube Q30 MIN PRN Yani Jackson M.D.        levothyroxine (Synthroid) tablet 50 mcg  50 mcg Oral AM ES Yani Jackson M.D.        fentaNYL (Sublimaze) injection 25 mcg  25 mcg Intravenous Q HOUR PRN Yani Jackson M.D.   25 mcg at 06/26/24 1807       Fluids    Intake/Output Summary (Last 24 hours) at 6/29/2024 0804  Last data filed at 6/29/2024 0600  Gross per 24 hour   Intake 3187.73 ml   Output 1270 ml   Net 1917.73 ml       Laboratory  Recent Labs     06/26/24  1620 06/27/24  0443   ISTATAPH 7.410 7.524*   ISTATAPCO2 45.7* 31.0   ISTATAPO2 52* 71   ISTATATCO2 30 26   LUHSMPB9CTI 86* 96   ISTATARTHCO3 29.0* 25.5*   ISTATARTBE 4* 3   ISTATTEMP 98.5 F 38.1 C   ISTATFIO2 40 40   ISTATSPEC Arterial Arterial   ISTATAPHTC 7.411 7.507*   [id number] 52* 77         Recent Labs     06/27/24  0238 06/28/24  0457  06/28/24  1525 06/29/24  0445   SODIUM 125* 126*  --  126*   POTASSIUM 3.4* 2.5* 3.2* 3.4*   CHLORIDE 89* 86*  --  88*   CO2 26 28  --  27   BUN 16 17  --  24*   CREATININE 0.50 0.44*  --  0.52   MAGNESIUM 2.3 1.8  --  1.8   PHOSPHORUS 1.9* 2.8  --  2.9   CALCIUM 7.7* 8.5  --  8.5     Recent Labs     06/27/24  0238 06/28/24  0455 06/29/24  0445   ALTSGPT 9 15 16   ASTSGOT 16 26 29   ALKPHOSPHAT 47 58 60   TBILIRUBIN 0.6 0.9 0.5   GLUCOSE 91 80 106*     Recent Labs     06/26/24  1725 06/27/24  0238 06/28/24  0420 06/28/24  0455 06/29/24  0445   WBC 0.6*  0.6* 0.9* 4.2*  --  11.1*   NEUTSPOLYS 42.00*  --   --   --   --    LYMPHOCYTES 37.00  --   --   --   --    MONOCYTES 4.00  --   --   --   --    EOSINOPHILS 0.00  --   --   --   --    BASOPHILS 0.00  --   --   --   --    ASTSGOT  --  16  --  26 29   ALTSGPT  --  9  --  15 16   ALKPHOSPHAT  --  47  --  58 60   TBILIRUBIN  --  0.6  --  0.9 0.5     Recent Labs     06/27/24  0238 06/28/24  0420 06/29/24  0136 06/29/24 0445   RBC 1.83* 2.78*  --  3.06*   HEMOGLOBIN 6.3* 9.1*  --  10.0*   HEMATOCRIT 17.3* 25.6*  --  28.7*   PLATELETCT 49* 60*  --  89*   PROTHROMBTM  --   --  15.2*  --    INR  --   --  1.15*  --        Imaging  X-Ray:  I have personally reviewed the images and compared with prior images.    Assessment/Plan  #Left arm weakness and facial droop  Plan:  - CT brain ordered but at the time patient refused  - Unable to anticoagulate patient due to lack of imaging.     #New onset afib/RVR  Plan:  - No AC due to acute onset and recent severe pancytopenia   - Amio gtt  - Metoprolol pushes as needed  - s/p 1L LR yesterday     #RLL pneumonia  #Chronic Aspiration  Plan:  - Cover with cefepime for HAP and Prior Enterobacter infection x 7 days  - ID consulting  - Solumedrol 40 BID  - HOB elevation  - Aspiration precautions  - Titrate oxygen to an SpO2 of 88-94%  - Airway clearance as needed     #Severe Dysphagia - s/p FEES 6/28/24  - Unable to tolerate any PO intake  without significant aspiration  Plan:  - Discussed the chronic nature of this with patient yesterday  - GI consulted for PEG tube - likely no longer a candidate due to last nights events.     #Pancytopenia -  - improving today  #Anemia (normocytic)  Plan:  - Monitor counts  - Broad spectrum coverage with cefepime  - Discussed with ID  - s/p 1 unit PRBC  - Hold DVT prophylaxis     #Septic shock 2/2 pneumonia present on admission - resolved  Plan:  - MAP goal > 65  - Fluid resuscitated with 2L NS at OSH  - Trend lactate PRN - resolved     #Hyponatremia  Plan:  - Monitor on labs    #Hypophosphatemia  #Hypokalemia  #Hypomagnesemia  Plan:  - Monitor on labs  - Replete as needed    #Unresponsive on admission - recovered  Plan:  - Stat CT head with motion artifact but essentially negative  - Patient woke up off sedation and responds, moves all extremities.      #History of Metastatic Laryngeal CA currently on chemotherapy  Plan:  - Monitor cell counts     #History of aspiration since radiation and resection for her cancer treatment  Plan:  - Aspiration precautions     #History of COPD  Plan:  - Solumedrol 40mg BID   - Duonebs as needed     #Hypothyroidism  Plan:  - Restarted home synthroid for tomorrow am     #Goals of Care   - Patient was seen by Palliative care yesterday and was interactive with them, deciding ultimately that she wanted to be DNR/DNI, wanted to stop chemotherapy and go home with hospice.  However, she was adamant that she wanted a PEG tube first.  Overnight more conversations took place after she began to display stroke like symptoms.  She initially agreed to CT head but then declined when in radiology and was brought back to the room.  She declined significantly after that and is unresponsive this morning with signs of imminent death.  I have called her SO to discuss but he is driving here now so will touch base when he arrives.      I have performed a physical exam and reviewed and updated ROS  and Plan today (6/29/2024). In review of yesterday's note (6/28/2024), there are no changes except as documented above.     Discussed patient condition and risk of morbidity and/or mortality with RN, RT, Pharmacy   The patient remains critically ill.  Critical care time = 45 minutes in directly providing and coordinating critical care and extensive data review.  No time overlap and excludes procedures.    Yani Jackson MD RD  Pulmonary and Critical Care    Available on Voalte

## 2024-06-29 NOTE — CARE PLAN
Problem: Skin Integrity  Goal: Skin integrity is maintained or improved  Outcome: Progressing     Problem: Respiratory  Goal: Patient will achieve/maintain optimum respiratory ventilation and gas exchange  Outcome: Not Progressing     Problem: Risk for Aspiration  Goal: Patient's risk for aspiration will be absent or decrease  Outcome: Not Progressing     The patient is Watcher - Medium risk of patient condition declining or worsening    Shift Goals  Clinical Goals: pain control, amio gtt, HR<120  Patient Goals: comfort, rest  Family Goals: NICK    Progress made toward(s) clinical / shift goals:      Patient is not progressing towards the following goals:      Problem: Knowledge Deficit - Standard  Goal: Patient and family/care givers will demonstrate understanding of plan of care, disease process/condition, diagnostic tests and medications  Outcome: Not Progressing     Problem: Respiratory  Goal: Patient will achieve/maintain optimum respiratory ventilation and gas exchange  Outcome: Not Progressing     Problem: Risk for Aspiration  Goal: Patient's risk for aspiration will be absent or decrease  Outcome: Not Progressing

## 2024-06-29 NOTE — ACP (ADVANCE CARE PLANNING)
Advance Care Planning Note    Discussion date:  6/29/2024  Discussion participants:  significant other and son    The patient wishes to discuss Advanced Care Planning today and the following is a brief summary of our discussion.    Patient does not have  capacity to make their own medical decisions.  Health Care Agent/Surrogate Decision Maker not documented in chart.    Documents of Advance Directives:  POLST    Communication of relevant diagnoses: New onset stroke symptoms in the setting of new onset afib with metastatic laryngeal CA which appears to be progressing despite treatment, severe chronic dysphagia 2/2 laryngeal mass and radiation therapy/surgery, severe protein calorie malnutrition and failure to thrive.      Communication of prognosis: poor    Communication of treatment goals/options: comfort care    Treatment decisions:  Proceed with comfort care as there are no other feasible options for treatment at this time given the severity of her disease process effecting multiple organ systems. Patient is unresponsive today and unable to participate in GOC however did express to Palliative Care yesterday her wishes to go home with hospice and stop all chemotherapy.      Code status:  do not attempt resuscitation (DNAR)     Time statement:  I discussed advance care planning with the patient's family for at least 20 minutes, including diagnosis, prognosis, plan of care, risks and benefits of any therapies that could be offered, as well as alternatives including palliation and hospice, as appropriate, exclusive of evaluation and management or other separately billable procedures.    Yani Jackson M.D.

## 2024-06-29 NOTE — PROGRESS NOTES
OVERNIGHT HOSPITALIST:    Paged by nursing Staff about this patient and came to the room to see her as nursing staff noticed Flavia Sheila, left upper extremity weakness and left eyelid drooping.  Patient has been increasingly agitated, pulling her O2 probe and touching and trying to pull her peripheral IV as well.      Brief History: 63-year-old female, history of metastatic laryngeal cancer and recent admission for postobstructive pneumonia, came as a direct admission from [facility] for acute respiratory failure. Intubated at outside facility and extubated on 9/27. Currently on Cefepime and off pressors. Stat CT of the head  on admission showed motion artifact but essentially negative done for unresponsiveness on admission. She was started on amiodarone ggt yesterday and required a few doses of metoprolol this shift.  Seen by palliative care yesterday, CODE STATUS changed to DNR-DNI.      Vitals: Temp:36.8       HR:107      RR:34      BP:119/63  Gen: Cachexia, somnolent but appropriately answering questions  Lungs: Normal pulmonary effort with no respiratory distress and some rhonchi on bases.  Heart: Irregular irregular rate and rhythm  Neuro: Patient has left eyelid drooping, unequal pupils and decreased strength to her left upper extremity, drift in her arm with gravity      A/P:  #1: New left upper extremity weakness, left facial droop and anisocoria  #2: A-fib with RVR-?  New, not on anticoagulation  #3. Metastatic Laryngeal Cancer        -I came to the room and talk to the patient.  In light of her palliative care consult done yesterday, prior ordering CT imaging, I discussed with her goals of care.  She states that once the CT done.  CT imaging/stroke rule out was ordered however while patient was on the CT machine, she became very agitated, saying that she no longer wanted to have the CT scan done.  I came down to CT suite, I ordered 1 mg of Ativan and in spite of that, she remained agitating and  refusing CT.  - Patient is cachectic, has chronic aspiration and recurrent pneumonia, she is emancipated and severely malnourished, has palpable tumor despite chemotherapy and made DNR-DNI earlier in the day.  -Given inability to do CT imaging, do not feel comfortable ordering anticoagulation for stroke prevention related to her new onset A-fib.  EKG was done and A-fib with RVR is confirmed.  Luckily, her heart rate this morning is around 114 bpm however her oxygen needs are increasing and she is now maxed out on a nonrebreather mask.  -I called her son Julio however he did not answer the phone and his mailbox is full.  I also requested nursing staff to call him and they were unable to communicate with him.  I also called her significant other, . Norman Gee who is aware of the overnight updates.  -Patient is more confused, lethargic and at times even combative reason why we had to add restraints as she is trying to pull her IV lines and oxygen.  -She will likely be a good candidate to transition to hospice/palliative care, but at this point, have to be very careful not to worsen her respiratory status.          The patient remains critically ill.  Critical care time =77  minutes in directly providing and coordinating critical care and extensive data review.  This includes time spent before the visit reviewing the chart, time spent evaluating the patient face-to-face  in the room, time discussing and updating Nursing Staff about plan and time spent after the visit reviewing results and on documentation. No time overlap and excludes procedures.

## 2024-06-29 NOTE — PROGRESS NOTES
12-hour chart check complete.    Monitor Summary  Rhythm: A-Fib  Rate: 's  Ectopy: VT runs of 2-4 beats  Measurements: -/0.08/-

## 2024-06-29 NOTE — PROGRESS NOTES
Approx 0100 pt was noticed to have symptoms of changed neuro status of droopy left eyelid, unequal pupil sizes, and left arm drift with NIH score of 2. Pt was noticeably more agitated pulling out IV and attempting to pull of O2 probe. Dr. Strickland notified and came to bedside to evaluate pt and new orders received for stat CT head.     0215 pt was brought down to CT accompanied by this RN, charge RN, and CCT. Pt became increasingly agitated, confused, and combative in the CT. Notified Dr. Strickland and received order for 1 mg of Ativan. Dr. Strickland came to CT to evaluate pt and pt expressed refusal for CT so it was decided to discontinue the CT and pt was brought back to unit. No new orders at this time.  Pt continues to have altered mental status and appears flat, withdrawn, and refuses to answer AxO questions at this time. Pt appears to have new symptom of visual hallucinations.    0330 Pt is restless and continuously attempting to get out of bed and pulling off O2. Dr. Strickland notified and received order for soft wrist restraints for pt safety.

## 2024-06-29 NOTE — PROGRESS NOTES
Patient transition to comfort care. Family at bedside. Questions have been answered. Bereavement tray provided. Monitor tech & charge RN updated. Patient appears to be comfortable currently, will continue to monitor. Family told to notify RN if Meme appears to be uncomfortable & will administer PRN meds. Drips shut off per MD verbal order, oxygen changed to 2L NC.

## 2024-06-30 VITALS
RESPIRATION RATE: 18 BRPM | DIASTOLIC BLOOD PRESSURE: 89 MMHG | SYSTOLIC BLOOD PRESSURE: 106 MMHG | OXYGEN SATURATION: 80 % | HEART RATE: 88 BPM | HEIGHT: 68 IN | WEIGHT: 92.59 LBS | TEMPERATURE: 97.5 F | BODY MASS INDEX: 14.03 KG/M2

## 2024-06-30 PROBLEM — I63.9 STROKE (CEREBRUM) (HCC): Status: ACTIVE | Noted: 2024-01-01

## 2024-06-30 PROBLEM — I48.20 CHRONIC ATRIAL FIBRILLATION (HCC): Status: ACTIVE | Noted: 2024-06-30

## 2024-06-30 LAB
BACTERIA SPEC RESP CULT: ABNORMAL
GRAM STN SPEC: ABNORMAL
SIGNIFICANT IND 70042: ABNORMAL
SITE SITE: ABNORMAL
SOURCE SOURCE: ABNORMAL

## 2024-06-30 PROCEDURE — 770001 HCHG ROOM/CARE - MED/SURG/GYN PRIV*

## 2024-06-30 PROCEDURE — 94760 N-INVAS EAR/PLS OXIMETRY 1: CPT

## 2024-06-30 PROCEDURE — 700102 HCHG RX REV CODE 250 W/ 637 OVERRIDE(OP): Performed by: INTERNAL MEDICINE

## 2024-06-30 PROCEDURE — A9270 NON-COVERED ITEM OR SERVICE: HCPCS | Performed by: INTERNAL MEDICINE

## 2024-06-30 PROCEDURE — 700101 HCHG RX REV CODE 250: Performed by: INTERNAL MEDICINE

## 2024-06-30 PROCEDURE — 99497 ADVNCD CARE PLAN 30 MIN: CPT | Performed by: INTERNAL MEDICINE

## 2024-06-30 PROCEDURE — 700111 HCHG RX REV CODE 636 W/ 250 OVERRIDE (IP): Performed by: INTERNAL MEDICINE

## 2024-06-30 PROCEDURE — 99233 SBSQ HOSP IP/OBS HIGH 50: CPT | Mod: 25 | Performed by: INTERNAL MEDICINE

## 2024-06-30 RX ORDER — ATROPINE SULFATE 10 MG/ML
2 SOLUTION/ DROPS OPHTHALMIC EVERY 4 HOURS PRN
Status: DISCONTINUED | OUTPATIENT
Start: 2024-06-30 | End: 2024-07-01 | Stop reason: HOSPADM

## 2024-06-30 RX ORDER — MORPHINE SULFATE 100 MG/5ML
10 SOLUTION ORAL
Status: DISCONTINUED | OUTPATIENT
Start: 2024-06-30 | End: 2024-07-01 | Stop reason: HOSPADM

## 2024-06-30 RX ORDER — ACETAMINOPHEN 650 MG/1
650 SUPPOSITORY RECTAL EVERY 4 HOURS PRN
Status: DISCONTINUED | OUTPATIENT
Start: 2024-06-30 | End: 2024-07-01 | Stop reason: HOSPADM

## 2024-06-30 RX ORDER — DOCUSATE SODIUM 100 MG/1
100 CAPSULE, LIQUID FILLED ORAL EVERY 12 HOURS
Status: DISCONTINUED | OUTPATIENT
Start: 2024-06-30 | End: 2024-07-01 | Stop reason: HOSPADM

## 2024-06-30 RX ORDER — CARBOXYMETHYLCELLULOSE SODIUM 5 MG/ML
1 SOLUTION/ DROPS OPHTHALMIC PRN
Status: DISCONTINUED | OUTPATIENT
Start: 2024-06-30 | End: 2024-07-01 | Stop reason: HOSPADM

## 2024-06-30 RX ORDER — LORAZEPAM 2 MG/ML
1 INJECTION INTRAMUSCULAR
Status: DISCONTINUED | OUTPATIENT
Start: 2024-06-30 | End: 2024-07-01 | Stop reason: HOSPADM

## 2024-06-30 RX ORDER — LORAZEPAM 2 MG/ML
1 CONCENTRATE ORAL
Status: DISCONTINUED | OUTPATIENT
Start: 2024-06-30 | End: 2024-07-01 | Stop reason: HOSPADM

## 2024-06-30 RX ORDER — ACETAMINOPHEN 325 MG/1
650 TABLET ORAL EVERY 4 HOURS PRN
Status: DISCONTINUED | OUTPATIENT
Start: 2024-06-30 | End: 2024-07-01 | Stop reason: HOSPADM

## 2024-06-30 RX ORDER — HYDROMORPHONE HYDROCHLORIDE 2 MG/ML
4 INJECTION, SOLUTION INTRAMUSCULAR; INTRAVENOUS; SUBCUTANEOUS
Status: DISCONTINUED | OUTPATIENT
Start: 2024-06-30 | End: 2024-06-30

## 2024-06-30 RX ORDER — HYDROMORPHONE HYDROCHLORIDE 2 MG/ML
2 INJECTION, SOLUTION INTRAMUSCULAR; INTRAVENOUS; SUBCUTANEOUS
Status: DISCONTINUED | OUTPATIENT
Start: 2024-06-30 | End: 2024-07-01 | Stop reason: HOSPADM

## 2024-06-30 RX ORDER — HYDROMORPHONE HYDROCHLORIDE 2 MG/ML
4 INJECTION, SOLUTION INTRAMUSCULAR; INTRAVENOUS; SUBCUTANEOUS
Status: DISCONTINUED | OUTPATIENT
Start: 2024-06-30 | End: 2024-07-01 | Stop reason: HOSPADM

## 2024-06-30 RX ORDER — HYDROMORPHONE HYDROCHLORIDE 2 MG/ML
2 INJECTION, SOLUTION INTRAMUSCULAR; INTRAVENOUS; SUBCUTANEOUS
Status: DISCONTINUED | OUTPATIENT
Start: 2024-06-30 | End: 2024-06-30

## 2024-06-30 RX ADMIN — LORAZEPAM 1 MG: 2 INJECTION INTRAMUSCULAR; INTRAVENOUS at 19:53

## 2024-06-30 RX ADMIN — MORPHINE SULFATE 10 MG: 10 SOLUTION ORAL at 17:53

## 2024-06-30 RX ADMIN — LORAZEPAM 0.5 MG: 2 INJECTION INTRAMUSCULAR; INTRAVENOUS at 08:19

## 2024-06-30 RX ADMIN — LORAZEPAM 1 MG: 2 INJECTION INTRAMUSCULAR; INTRAVENOUS at 15:33

## 2024-06-30 RX ADMIN — ATROPINE SULFATE 2 DROP: 10 SOLUTION/ DROPS OPHTHALMIC at 02:49

## 2024-06-30 RX ADMIN — LORAZEPAM 1 MG: 2 INJECTION INTRAMUSCULAR; INTRAVENOUS at 10:52

## 2024-06-30 RX ADMIN — LORAZEPAM 1 MG: 2 INJECTION INTRAMUSCULAR; INTRAVENOUS at 13:48

## 2024-06-30 RX ADMIN — LORAZEPAM 0.5 MG: 2 INJECTION INTRAMUSCULAR; INTRAVENOUS at 00:03

## 2024-06-30 RX ADMIN — MORPHINE SULFATE 10 MG: 10 SOLUTION ORAL at 20:04

## 2024-06-30 RX ADMIN — HYDROMORPHONE HYDROCHLORIDE 0.1 MG: 1 INJECTION, SOLUTION INTRAMUSCULAR; INTRAVENOUS; SUBCUTANEOUS at 03:00

## 2024-06-30 ASSESSMENT — ENCOUNTER SYMPTOMS
HEMOPTYSIS: 0
CONSTIPATION: 0
TREMORS: 0
MYALGIAS: 1
HEADACHES: 0
CHILLS: 0
BLOOD IN STOOL: 0
SHORTNESS OF BREATH: 0
FEVER: 0
INSOMNIA: 0
EYE REDNESS: 0
SEIZURES: 0
NERVOUS/ANXIOUS: 0
EYE PAIN: 0
NAUSEA: 0
WHEEZING: 0
ABDOMINAL PAIN: 0
FALLS: 0
WEAKNESS: 1
FOCAL WEAKNESS: 0
VOMITING: 0
DIZZINESS: 0
COUGH: 0
PALPITATIONS: 0
DIARRHEA: 0
LOSS OF CONSCIOUSNESS: 0

## 2024-06-30 ASSESSMENT — PAIN DESCRIPTION - PAIN TYPE: TYPE: ACUTE PAIN

## 2024-06-30 NOTE — PROGRESS NOTES
Hospital Medicine Daily Progress Note    Date of Service  6/30/2024    Chief Complaint  Meme Hurd is a 63 y.o. female admitted 6/26/2024 with No chief complaint on file.        Hospital Course  No notes on file    Interval Problem Update  Patient seen and examine at bedside  I reviewed the chart along with vitals, labs, imaging, test (both pending and resulted) and recommendations from specialists and interdisciplinary team.  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 COMFORT CARE.  I spoke with family and patient. Currently patient stable. We adjusted her pain medications. She occasionally aspirates or cough on her current diet. I spent time speaking with the family about hospice at home versus in the facility.     I have discussed this patient's plan of care and discharge plan at IDT rounds today with Case Management, Nursing, Nursing leadership, and other members of the IDT team.    Consultants/Specialty  Intensivist admitted  Hospitalist    Code Status  Comfort Care/DNR    Disposition  The patient is medically cleared for discharge to home or a post-acute facility.  Anticipate discharge to: hospice    I have placed the appropriate orders for post-discharge needs.    Review of Systems  Review of Systems   Constitutional:  Negative for chills and fever.   HENT:  Negative for congestion, hearing loss and nosebleeds.    Eyes:  Negative for pain and redness.   Respiratory:  Negative for cough, hemoptysis, shortness of breath and wheezing.    Cardiovascular:  Negative for chest pain and palpitations.   Gastrointestinal:  Negative for abdominal pain, blood in stool, constipation, diarrhea, nausea and vomiting.   Genitourinary:  Negative for dysuria, frequency and hematuria.   Musculoskeletal:  Positive for myalgias. Negative for falls and joint pain.   Skin:  Negative for rash.    Neurological:  Positive for weakness. Negative for dizziness, tremors, focal weakness, seizures, loss of consciousness and headaches.   Psychiatric/Behavioral:  The patient is not nervous/anxious and does not have insomnia.    All other systems reviewed and are negative.       Physical Exam  Temp:  [35.7 °C (96.2 °F)] 35.7 °C (96.2 °F)  Pulse:  [] 77  Resp:  [12-14] 12  BP: (62)/(47) 62/47  SpO2:  [90 %-92 %] 92 %    Physical Exam  Vitals and nursing note reviewed.   Constitutional:       General: She is not in acute distress.     Appearance: She is ill-appearing.      Comments: THin, frail   HENT:      Head: Normocephalic and atraumatic.      Right Ear: External ear normal.      Left Ear: External ear normal.      Nose: Nose normal.      Mouth/Throat:      Mouth: Mucous membranes are moist.   Eyes:      General: No scleral icterus.     Conjunctiva/sclera: Conjunctivae normal.   Cardiovascular:      Rate and Rhythm: Normal rate and regular rhythm.      Pulses: Normal pulses.      Heart sounds: No murmur heard.     No friction rub. No gallop.   Pulmonary:      Effort: Pulmonary effort is normal. No respiratory distress.      Breath sounds: Normal breath sounds. No stridor. No wheezing, rhonchi or rales.   Chest:      Chest wall: No tenderness.   Abdominal:      General: Abdomen is flat. Bowel sounds are normal. There is no distension.      Palpations: Abdomen is soft.      Tenderness: There is no abdominal tenderness. There is no guarding or rebound.   Musculoskeletal:         General: No swelling, tenderness or deformity. Normal range of motion.      Cervical back: Normal range of motion and neck supple. No rigidity.   Skin:     General: Skin is warm.      Coloration: Skin is not jaundiced.   Neurological:      General: No focal deficit present.      Mental Status: She is alert and oriented to person, place, and time. Mental status is at baseline.      Motor: Weakness present.   Psychiatric:         Mood and  Affect: Mood normal.         Behavior: Behavior normal.         Thought Content: Thought content normal.         Judgment: Judgment normal.         Fluids    Intake/Output Summary (Last 24 hours) at 6/30/2024 1556  Last data filed at 6/30/2024 1300  Gross per 24 hour   Intake 0 ml   Output 225 ml   Net -225 ml       Laboratory  Recent Labs     06/28/24  0420 06/29/24  0445   WBC 4.2* 11.1*   RBC 2.78* 3.06*   HEMOGLOBIN 9.1* 10.0*   HEMATOCRIT 25.6* 28.7*   MCV 92.1 93.8   MCH 32.7 32.7   MCHC 35.5 34.8   RDW 51.8* 55.0*   PLATELETCT 60* 89*   MPV 11.5 9.6     Recent Labs     06/28/24  0455 06/28/24  1525 06/29/24  0445   SODIUM 126*  --  126*   POTASSIUM 2.5* 3.2* 3.4*   CHLORIDE 86*  --  88*   CO2 28  --  27   GLUCOSE 80  --  106*   BUN 17  --  24*   CREATININE 0.44*  --  0.52   CALCIUM 8.5  --  8.5     Recent Labs     06/29/24  0136   INR 1.15*               Imaging  DX-CHEST-PORTABLE (1 VIEW)   Final Result         1.  Pulmonary infiltrates, similar to prior study.   2.  Cardiomegaly      DX-CHEST-PORTABLE (1 VIEW)   Final Result         1.  Hazy right pulmonary infiltrates, slightly increased since prior study      CT-HEAD W/O   Final Result      No definite acute intracranial abnormality although evaluation is limited by patient motion.               DX-CHEST-PORTABLE (1 VIEW)   Final Result      Endotracheal tube is in good position. There are bibasilar infiltrates, right worse than left.      OUTSIDE IMAGES-DX CHEST   Final Result      OUTSIDE IMAGES-CT CERVICAL SPINE   Final Result           Assessment/Plan  * Hypoxic respiratory failure (HCC)- (present on admission)  Assessment & Plan  O2 per protocol  COomfort care    Stroke (cerebrum) (HCC)  Assessment & Plan  COmfort Care    Chronic atrial fibrillation (HCC)  Assessment & Plan  COmfort Care    Anxiety in cancer patient  Assessment & Plan  PRN Ativan    Cancer associated pain  Assessment & Plan  Ordered pain control IV and sublingual  narcotics    Laryngeal cancer (HCC)- (present on admission)  Assessment & Plan  Metastatic  Primary mass s/p radiation  Comfort Care, most dysphagia friendly diet         VTE prophylaxis: CC    I have performed a physical exam and reviewed and updated ROS and Plan today (6/30/2024). In review of yesterday's note (6/29/2024), there are no changes except as documented above.  Patient is has a high medical complexity, complex decision making and is at high risk for complication, morbidity, and mortality, thus requiring the highest level of my preparedness for sudden, emergent intervention. Medical decision making is therefore complex. I provided  services, which included ordering labs and/or imaging, and discussing the case with various consultants.medication orders, frequent reevaluations of the patient's condition and response to treatment. Time was also devoted to counseling and coordinating care including review of records, pertinent lab data and studies, as well as discussing diagnostic evaluation and work up, planned therapeutic interventions and future disposition of care. Where indicated, the assessment and plan reflect discussion of patient with consultants, other healthcare providers, family members, and additional research needed to obtain further information in formulating the plan of care for Meme Hurd. Total time spent was 52 minutes.   In addition to that I spent another 15 minutes for advanced care/counseling discussing and confirming code status and goals of care with patient, family, consultants and Palliative team.

## 2024-06-30 NOTE — CARE PLAN
The patient is Unstable - High likelihood or risk of patient condition declining or worsening    Shift Goals  Clinical Goals: Comfort  Patient Goals: Comfort  Family Goals: NICK    Progress made toward(s) clinical / shift goals:    Problem: Psychosocial  Goal: Patient's level of anxiety will decrease  Outcome: Progressing  Goal: Patient and family will demonstrate ability to cope with life altering diagnosis and/or procedure  Outcome: Progressing     Problem: Knowledge Deficit - Comfort Care  Goal: Patient and family/care givers will demonstrate understanding of dying process and grieving  Outcome: Progressing     Problem: Psychosocial - Comfort Care  Goal: Patient's level of anxiety will decrease  Outcome: Progressing  Goal: Patient and family will demonstrate ability to cope with life altering diagnosis and/or procedure  Outcome: Progressing  Goal: Privacy will be maintained for patient and family  Outcome: Progressing       Patient is not progressing towards the following goals:

## 2024-06-30 NOTE — CARE PLAN
The patient is Watcher - Medium risk of patient condition declining or worsening    Shift Goals  Clinical Goals: comfort care  Patient Goals: comfort  Family Goals: NICK    Progress made toward(s) clinical / shift goals:  pt. Resting comfortably in bed. 0/10 pain, 3/10 anxiety. Family at bedside. Axox4.      Problem: Knowledge Deficit - Standard  Goal: Patient and family/care givers will demonstrate understanding of plan of care, disease process/condition, diagnostic tests and medications  Outcome: Progressing     Problem: Psychosocial  Goal: Patient's level of anxiety will decrease  Outcome: Progressing     Problem: Psychosocial  Goal: Patient's ability to verbalize feelings about condition will improve  Outcome: Progressing     Problem: Psychosocial  Goal: Patient's ability to re-evaluate and adapt role responsibilities will improve  Outcome: Progressing     Problem: Psychosocial  Goal: Patient and family will demonstrate ability to cope with life altering diagnosis and/or procedure  Outcome: Progressing     Problem: Psychosocial  Goal: Spiritual and cultural needs incorporated into hospitalization  Outcome: Progressing

## 2024-07-01 PROCEDURE — 700101 HCHG RX REV CODE 250: Performed by: INTERNAL MEDICINE

## 2024-07-01 PROCEDURE — A9270 NON-COVERED ITEM OR SERVICE: HCPCS | Performed by: INTERNAL MEDICINE

## 2024-07-01 PROCEDURE — 700111 HCHG RX REV CODE 636 W/ 250 OVERRIDE (IP): Mod: JZ | Performed by: INTERNAL MEDICINE

## 2024-07-01 PROCEDURE — 700102 HCHG RX REV CODE 250 W/ 637 OVERRIDE(OP): Performed by: INTERNAL MEDICINE

## 2024-07-01 PROCEDURE — 99239 HOSP IP/OBS DSCHRG MGMT >30: CPT | Performed by: INTERNAL MEDICINE

## 2024-07-01 RX ADMIN — MORPHINE SULFATE 10 MG: 10 SOLUTION ORAL at 12:03

## 2024-07-01 RX ADMIN — LORAZEPAM 1 MG: 2 INJECTION INTRAMUSCULAR; INTRAVENOUS at 10:42

## 2024-07-01 RX ADMIN — ATROPINE SULFATE 2 DROP: 10 SOLUTION/ DROPS OPHTHALMIC at 11:55

## 2024-07-01 RX ADMIN — HYDROMORPHONE HYDROCHLORIDE 2 MG: 2 INJECTION INTRAMUSCULAR; INTRAVENOUS; SUBCUTANEOUS at 12:21

## 2024-07-01 NOTE — CARE PLAN
The patient is Unstable - High likelihood or risk of patient condition declining or worsening    Shift Goals  Clinical Goals: comfort care  Patient Goals: comfort  Family Goals: NICK    Progress made toward(s) clinical / shift goals:  pt. Resting comfortably in bed. FLACC score  0.      Problem: Psychosocial  Goal: Patient's level of anxiety will decrease  Outcome: Progressing  Goal: Patient and family will demonstrate ability to cope with life altering diagnosis and/or procedure  Outcome: Progressing  Goal: Spiritual and cultural needs incorporated into hospitalization  Outcome: Progressing     Problem: Knowledge Deficit - Comfort Care  Goal: Patient and family/care givers will demonstrate understanding of dying process and grieving  Outcome: Progressing     Problem: Psychosocial - Comfort Care  Goal: Patient's level of anxiety will decrease  Outcome: Progressing  Goal: Patient and family will demonstrate ability to cope with life altering diagnosis and/or procedure  Outcome: Progressing  Goal: Spiritual and cultural needs incorporated into hospitalization  Outcome: Progressing  Goal: Privacy will be maintained for patient and family  Outcome: Progressing     Problem: Discharge Planning - Comfort Care  Goal: Patient's continuum of care needs are met  Outcome: Progressing     Problem: Respiratory - Comfort Care  Goal: Patient's respiratory function will be supported  Outcome: Progressing

## 2024-07-01 NOTE — PROGRESS NOTES
Pt. Passed away at 1247 two RN pronounced family at bedside.  All expiration paperwork documented. Dr. Roberts notified,  notified, transplant notified, charge nurse notified,  home called.  All post mortem care was provided, all jewelry was removed.  Per  okay to remove all lines and drains from patient not a coroners case. central line removed, reid removed.  and pt. Belongings sent home with family.  Pt. Was dressed in clothing and placed in a bag. Offered family  services and any help they should need.   home came and pt. Discharged with [employer]hele montesinos removal cremation technician at [facility] paperwork signed and placed in chart.

## 2024-07-01 NOTE — THERAPY
Speech Language Therapy Contact Note    Patient Name: Meme Hurd  Age:  63 y.o., Sex:  female  Medical Record #: [id number]  Today's Date: 7/1/2024 07/01/24 0845   Treatment Variance   Reason For Missed Therapy Medical - Other (Please Comment)  (Pt now comfort care)   Interdisciplinary Plan of Care Collaboration   IDT Collaboration with  Nursing   Collaboration Comments Per EMR, patient has transitioned to comfort care. SLP will d/c services. Please re-consult SLP if needed. Thank you.

## 2024-07-01 NOTE — CARE PLAN
The patient is Unstable - High likelihood or risk of patient condition declining or worsening    Shift Goals  Clinical Goals: comfort care  Patient Goals: comfort  Family Goals: NICK    Progress made toward(s) clinical / shift goals:    Problem: Psychosocial  Goal: Patient's level of anxiety will decrease  Outcome: Progressing  Goal: Patient and family will demonstrate ability to cope with life altering diagnosis and/or procedure  Outcome: Progressing  Goal: Spiritual and cultural needs incorporated into hospitalization  Outcome: Progressing     Problem: Psychosocial - Comfort Care  Goal: Patient's level of anxiety will decrease  Outcome: Progressing  Goal: Patient and family will demonstrate ability to cope with life altering diagnosis and/or procedure  Outcome: Progressing  Goal: Spiritual and cultural needs incorporated into hospitalization  Outcome: Progressing  Goal: Privacy will be maintained for patient and family  Outcome: Progressing     Problem: Discharge Planning - Comfort Care  Goal: Patient's continuum of care needs are met  Outcome: Progressing     Problem: Respiratory - Comfort Care  Goal: Patient's respiratory function will be supported  Outcome: Progressing       Patient is not progressing towards the following goals:

## 2024-07-01 NOTE — DISCHARGE PLANNING
Case Management Discharge Planning    Admission Date: 2024  GMLOS: 5.1  ALOS: 5    6-Clicks ADL Score: 19  6-Clicks Mobility Score: 15      Anticipated Discharge Dispo: Discharge Disposition: D/T to hospice home (50)    Action(s) Taken: Referral(s) sent    @[phone]  Per MD, patient is medically cleared to discharge with hospice. LSW explained [facility] is a volunteer organization and the only one that services patient's area. Patient's insurance is not contracted with the home health company in the area, so home health will be unable to follow for medical needs or equipment orders.    @[phone]  Hospice referral faxed to [facility], [phone].  Primary Contact: [employer], [phone].    @[phone]  Spoke to [facility] contact [employer]. Per [employer]qu[employer], they are unable to service patient as she has too many medical needs that they are not certified to take care of as a volunteer organization. Case escalated to leaders.    @[phone]  Spoke to family at bedside. Discussed hospice denial. Family expressed understanding and shared they've elected [facility] as their preferred mortuary, [phone].

## 2024-07-01 NOTE — DISCHARGE SUMMARY
Death Summary    Cause of Death  Respiratory failure due to aspiration due to stroke due to atrial fibrillation.    Comorbid Conditions at the Time of Death  Principal Problem:    Hypoxic respiratory failure (HCC) (POA: Yes)  Active Problems:    Laryngeal cancer (HCC) (POA: Yes)    Cancer associated pain (POA: Unknown)    Anxiety in cancer patient (POA: Unknown)    Chronic atrial fibrillation (HCC) (POA: Unknown)    Stroke (cerebrum) (HCC) (POA: Unknown)  Resolved Problems:    * No resolved hospital problems. *      History of Presenting Illness and Hospital Course  Please review Dr. Yani Jackson M.D. notes for further details of history of present illness, past medical/social/family histories, allergies and medications. Please review Dr. Roberts, Hospitalist consultation notes.  63 year old frail female with history of metastatic laryngal cancer s/p radiation of laryngeal mass on active chemotherapy c/b dysphagia and severe malnutrition and failure to thrive, recent hospitalization for pneumonia presented 2024 as a transfer from [place] for hypoxic respiratory failure. She first presented with shortness of breath and at [facility] was found to be hypoxic. She was intubated and placed on pressors and on transfer to [facility] ICU. She was started on antibiotics, steroids. Improved and extubated but she develops atrial fibrillation, stroke and aspiration related to that. Given her severe malnutrition and metastatic cance as well as poor prognosis, Palliative discussed with patient and she was made COMFORT CARE. I placed comfort care orders. APRYL/DANIELLA working on home with hospice but only one agency and could not provide all the hospice care where they live. SHe also could not qualify for group home, facility or hospice house with hospice due to insurance. We managed her hospice care here. She  comfortably.    Death Date: 24   Death Time: 1247         Pronounced By (RN1): chantal murphy  Pronounced  By (RN2): Shanta Roldan

## 2024-07-01 NOTE — RESPIRATORY CARE
"   COPD EDUCATION by COPD CLINICAL EDUCATOR  7/1/2024 at 6:57 AM by Meghan Joiner, RRT     Patient reviewed by COPD education team. Patient does have a history or diagnosis of COPD and is a former smoker.  However, patient does not qualify for the COPD program.    COPD Screen  COPD Risk Screening  Do you have a history of COPD?: Yes ((possibly?), patient unsure)    COPD Assessment  COPD Clinical Specialists ONLY  COPD Education Initiated: No--Quick Screen (Hx of metasttic laryngal cancer s/p radiation of laryngeal mass c/b dysphagia severe malnutrition failure to thrive now developed atrial fibrillation,stroke. COMFORT CARE, occasionally aspiratescough hospice at home versus in the facility.)  Interdisciplinary Rounds: Attendance at Rounds (30 Min)    PFT Results    No results found for: \"PFT\"    Meds to Beds        MY COPD ACTION PLAN     It is recommended that patients and physicians /healthcare providers complete this action plan together. This plan should be discussed at each physician visit and updated as needed.    The green, yellow and red zones show groups of symptoms of COPD. This list of symptoms is not comprehensive, and you may experience other symptoms. In the \"Actions\" column, your healthcare provider has recommended actions for you to take based on your symptoms.    Patient Name: Meme Hurd   YOB: 1960   Last Updated on:     Green Zone:  I am doing well today Actions     Usual activitiy and exercise level   Take daily medications     Usual amounts of cough and phlegm/mucus   Use oxygen as prescribed     Sleep well at night   Continue regular exercise/diet plan     Appetite is good   At all times avoid cigarette smoke, inhaled irritants     Daily Medications (these medications are taken every day):                Yellow Zone:  I am having a bad day or a COPD flare Actions     More breathless than usual   Continue daily medications     I have less energy for my daily activities   " "Use quick relief inhaler as ordered     Increased or thicker phlegm/mucus   Use oxygen as prescribed     Using quick relief inhaler/nebulizer more often   Get plenty of rest     Swelling of ankles more than usual   Use pursed lip breathing     More coughing than usual   At all times avoid cigarette smoke, inhaled irritants     I feel like I have a \"chest cold\"     Poor sleep and my symptoms woke me up     My appetite is not good     My medicine is not helping      Call provider immediately if symptoms don’t improve     Continue daily medications, add rescue medications:               Medications to be used during a flare up, (as Discussed with Provider):              Red Zone:  I need urgent medical care Actions     Severe shortness of breath even at rest   Call 911 or seek medical care immediately     Not able to do any activity because of breathing      Fever or shaking chills      Feeling confused or very drowsy       Chest pains      Coughing up blood                  "
